# Patient Record
Sex: FEMALE | Race: WHITE | URBAN - METROPOLITAN AREA
[De-identification: names, ages, dates, MRNs, and addresses within clinical notes are randomized per-mention and may not be internally consistent; named-entity substitution may affect disease eponyms.]

---

## 2017-12-24 ENCOUNTER — HOSPITAL ENCOUNTER (EMERGENCY)
Facility: CLINIC | Age: 47
Discharge: HOME | End: 2017-12-24
Attending: NURSE PRACTITIONER | Admitting: NURSE PRACTITIONER
Payer: COMMERCIAL

## 2017-12-24 ENCOUNTER — APPOINTMENT (OUTPATIENT)
Dept: GENERAL RADIOLOGY | Facility: CLINIC | Age: 47
End: 2017-12-24
Attending: NURSE PRACTITIONER
Payer: COMMERCIAL

## 2017-12-24 VITALS
SYSTOLIC BLOOD PRESSURE: 110 MMHG | BODY MASS INDEX: 36.32 KG/M2 | TEMPERATURE: 98.5 F | WEIGHT: 218 LBS | OXYGEN SATURATION: 100 % | HEIGHT: 65 IN | DIASTOLIC BLOOD PRESSURE: 63 MMHG | RESPIRATION RATE: 18 BRPM

## 2017-12-24 DIAGNOSIS — S82.832A OTHER CLOSED FRACTURE OF DISTAL END OF LEFT FIBULA, INITIAL ENCOUNTER: ICD-10-CM

## 2017-12-24 PROCEDURE — 73610 X-RAY EXAM OF ANKLE: CPT | Mod: LT

## 2017-12-24 PROCEDURE — 27786 TREATMENT OF ANKLE FRACTURE: CPT | Mod: LT

## 2017-12-24 PROCEDURE — 25000132 ZZH RX MED GY IP 250 OP 250 PS 637: Performed by: NURSE PRACTITIONER

## 2017-12-24 PROCEDURE — 99284 EMERGENCY DEPT VISIT MOD MDM: CPT | Mod: 25

## 2017-12-24 RX ORDER — HYDROCODONE BITARTRATE AND ACETAMINOPHEN 5; 325 MG/1; MG/1
1-2 TABLET ORAL EVERY 4 HOURS PRN
Qty: 15 TABLET | Refills: 0 | Status: SHIPPED | OUTPATIENT
Start: 2017-12-24

## 2017-12-24 RX ORDER — IBUPROFEN 200 MG
600 TABLET ORAL ONCE
Status: COMPLETED | OUTPATIENT
Start: 2017-12-24 | End: 2017-12-24

## 2017-12-24 RX ADMIN — IBUPROFEN 600 MG: 200 TABLET, FILM COATED ORAL at 21:22

## 2017-12-24 ASSESSMENT — ENCOUNTER SYMPTOMS
HEADACHES: 0
VOMITING: 1

## 2017-12-24 NOTE — ED AVS SNAPSHOT
Emergency Department    [address] [place] [id number]    Phone:  [phone]    Fax:  [phone]                                       Berenice Bowser   MRN: [id number]    Department:   Emergency Department   Date of Visit:  12/24/2017           After Visit Summary Signature Page     I have received my discharge instructions, and my questions have been answered. I have discussed any challenges I see with this plan with the nurse or doctor.    ..........................................................................................................................................  Patient/Patient Representative Signature      ..........................................................................................................................................  Patient Representative Print Name and Relationship to Patient    ..................................................               ................................................  Date                                            Time    ..........................................................................................................................................  Reviewed by Signature/Title    ...................................................              ..............................................  Date                                                            Time

## 2017-12-25 ENCOUNTER — HOSPITAL ENCOUNTER (OUTPATIENT)
Facility: CLINIC | Age: 47
Setting detail: OBSERVATION
Discharge: HOME | End: 2017-12-27
Attending: EMERGENCY MEDICINE | Admitting: INTERNAL MEDICINE
Payer: COMMERCIAL

## 2017-12-25 ENCOUNTER — APPOINTMENT (OUTPATIENT)
Dept: GENERAL RADIOLOGY | Facility: CLINIC | Age: 47
End: 2017-12-25
Attending: EMERGENCY MEDICINE
Payer: COMMERCIAL

## 2017-12-25 DIAGNOSIS — S82.892A ANKLE FRACTURE, LEFT, CLOSED, INITIAL ENCOUNTER: Primary | ICD-10-CM

## 2017-12-25 DIAGNOSIS — S62.102A LEFT WRIST FRACTURE: ICD-10-CM

## 2017-12-25 DIAGNOSIS — S62.102A FRACTURE OF WRIST, LEFT, CLOSED, INITIAL ENCOUNTER: ICD-10-CM

## 2017-12-25 PROBLEM — Z01.818 PREOPERATIVE EXAMINATION: Status: ACTIVE | Noted: 2017-12-25

## 2017-12-25 PROBLEM — Z95.0 H/O CARDIAC PACEMAKER: Status: ACTIVE | Noted: 2017-12-25

## 2017-12-25 PROBLEM — Z91.81 PERSONAL HISTORY OF FALL: Status: ACTIVE | Noted: 2017-12-25

## 2017-12-25 PROBLEM — E03.8 OTHER SPECIFIED HYPOTHYROIDISM: Chronic | Status: ACTIVE | Noted: 2017-12-25

## 2017-12-25 PROBLEM — S82.899A ANKLE FRACTURE: Status: ACTIVE | Noted: 2017-12-25

## 2017-12-25 PROCEDURE — 96374 THER/PROPH/DIAG INJ IV PUSH: CPT

## 2017-12-25 PROCEDURE — 25000128 H RX IP 250 OP 636: Performed by: EMERGENCY MEDICINE

## 2017-12-25 PROCEDURE — 99220 ZZC INITIAL OBSERVATION CARE,LEVL III: CPT | Performed by: INTERNAL MEDICINE

## 2017-12-25 PROCEDURE — 25000128 H RX IP 250 OP 636: Performed by: INTERNAL MEDICINE

## 2017-12-25 PROCEDURE — 29125 APPL SHORT ARM SPLINT STATIC: CPT

## 2017-12-25 PROCEDURE — 99285 EMERGENCY DEPT VISIT HI MDM: CPT | Mod: 25

## 2017-12-25 PROCEDURE — 93005 ELECTROCARDIOGRAM TRACING: CPT

## 2017-12-25 PROCEDURE — 93010 ELECTROCARDIOGRAM REPORT: CPT | Performed by: INTERNAL MEDICINE

## 2017-12-25 PROCEDURE — 25000132 ZZH RX MED GY IP 250 OP 250 PS 637: Performed by: INTERNAL MEDICINE

## 2017-12-25 PROCEDURE — 25000132 ZZH RX MED GY IP 250 OP 250 PS 637: Performed by: EMERGENCY MEDICINE

## 2017-12-25 PROCEDURE — 73110 X-RAY EXAM OF WRIST: CPT | Mod: LT

## 2017-12-25 PROCEDURE — 12000007 ZZH R&B INTERMEDIATE

## 2017-12-25 PROCEDURE — 99207 ZZC CDG-CODE CATEGORY CHANGED: CPT | Performed by: INTERNAL MEDICINE

## 2017-12-25 RX ORDER — BISACODYL 5 MG
15 TABLET, DELAYED RELEASE (ENTERIC COATED) ORAL DAILY PRN
Status: DISCONTINUED | OUTPATIENT
Start: 2017-12-25 | End: 2017-12-27 | Stop reason: HOSPADM

## 2017-12-25 RX ORDER — OXYCODONE HYDROCHLORIDE 5 MG/1
5-10 TABLET ORAL
Status: DISCONTINUED | OUTPATIENT
Start: 2017-12-25 | End: 2017-12-27 | Stop reason: HOSPADM

## 2017-12-25 RX ORDER — MORPHINE SULFATE 2 MG/ML
2-4 INJECTION, SOLUTION INTRAMUSCULAR; INTRAVENOUS
Status: DISCONTINUED | OUTPATIENT
Start: 2017-12-25 | End: 2017-12-27 | Stop reason: HOSPADM

## 2017-12-25 RX ORDER — NALOXONE HYDROCHLORIDE 0.4 MG/ML
.1-.4 INJECTION, SOLUTION INTRAMUSCULAR; INTRAVENOUS; SUBCUTANEOUS
Status: DISCONTINUED | OUTPATIENT
Start: 2017-12-25 | End: 2017-12-27 | Stop reason: HOSPADM

## 2017-12-25 RX ORDER — FENTANYL CITRATE 50 UG/ML
50 INJECTION, SOLUTION INTRAMUSCULAR; INTRAVENOUS
Status: DISCONTINUED | OUTPATIENT
Start: 2017-12-25 | End: 2017-12-25

## 2017-12-25 RX ORDER — ONDANSETRON 2 MG/ML
4 INJECTION INTRAMUSCULAR; INTRAVENOUS EVERY 6 HOURS PRN
Status: DISCONTINUED | OUTPATIENT
Start: 2017-12-25 | End: 2017-12-27 | Stop reason: HOSPADM

## 2017-12-25 RX ORDER — ONDANSETRON 4 MG/1
4 TABLET, ORALLY DISINTEGRATING ORAL EVERY 6 HOURS PRN
Status: DISCONTINUED | OUTPATIENT
Start: 2017-12-25 | End: 2017-12-27 | Stop reason: HOSPADM

## 2017-12-25 RX ORDER — METOPROLOL SUCCINATE 50 MG/1
1 TABLET, EXTENDED RELEASE ORAL DAILY
COMMUNITY

## 2017-12-25 RX ORDER — METOPROLOL SUCCINATE 50 MG/1
50 TABLET, EXTENDED RELEASE ORAL DAILY
Status: DISCONTINUED | OUTPATIENT
Start: 2017-12-25 | End: 2017-12-27 | Stop reason: HOSPADM

## 2017-12-25 RX ORDER — ACETAMINOPHEN 325 MG/1
650 TABLET ORAL EVERY 4 HOURS PRN
Status: DISCONTINUED | OUTPATIENT
Start: 2017-12-25 | End: 2017-12-27 | Stop reason: HOSPADM

## 2017-12-25 RX ORDER — BISACODYL 10 MG
10 SUPPOSITORY, RECTAL RECTAL DAILY PRN
Status: DISCONTINUED | OUTPATIENT
Start: 2017-12-25 | End: 2017-12-27 | Stop reason: HOSPADM

## 2017-12-25 RX ORDER — BISACODYL 5 MG
10 TABLET, DELAYED RELEASE (ENTERIC COATED) ORAL DAILY PRN
Status: DISCONTINUED | OUTPATIENT
Start: 2017-12-25 | End: 2017-12-27 | Stop reason: HOSPADM

## 2017-12-25 RX ORDER — DOCUSATE SODIUM 100 MG/1
100 CAPSULE, LIQUID FILLED ORAL 2 TIMES DAILY
Status: DISCONTINUED | OUTPATIENT
Start: 2017-12-25 | End: 2017-12-27 | Stop reason: HOSPADM

## 2017-12-25 RX ORDER — LEVOTHYROXINE SODIUM 137 UG/1
137 TABLET ORAL
Status: DISCONTINUED | OUTPATIENT
Start: 2017-12-26 | End: 2017-12-27 | Stop reason: HOSPADM

## 2017-12-25 RX ORDER — BISACODYL 5 MG
5 TABLET, DELAYED RELEASE (ENTERIC COATED) ORAL DAILY PRN
Status: DISCONTINUED | OUTPATIENT
Start: 2017-12-25 | End: 2017-12-27 | Stop reason: HOSPADM

## 2017-12-25 RX ORDER — ACETAMINOPHEN 500 MG
1000 TABLET ORAL ONCE
Status: COMPLETED | OUTPATIENT
Start: 2017-12-25 | End: 2017-12-25

## 2017-12-25 RX ORDER — SODIUM CHLORIDE 9 MG/ML
INJECTION, SOLUTION INTRAVENOUS CONTINUOUS
Status: DISCONTINUED | OUTPATIENT
Start: 2017-12-25 | End: 2017-12-26

## 2017-12-25 RX ORDER — IBUPROFEN 200 MG
200 TABLET ORAL EVERY 6 HOURS PRN
Status: DISCONTINUED | OUTPATIENT
Start: 2017-12-25 | End: 2017-12-27 | Stop reason: HOSPADM

## 2017-12-25 RX ADMIN — SODIUM CHLORIDE: 9 INJECTION, SOLUTION INTRAVENOUS at 10:53

## 2017-12-25 RX ADMIN — ACETAMINOPHEN 650 MG: 325 TABLET, FILM COATED ORAL at 12:08

## 2017-12-25 RX ADMIN — ACETAMINOPHEN 1000 MG: 500 TABLET, FILM COATED ORAL at 05:53

## 2017-12-25 RX ADMIN — ACETAMINOPHEN 650 MG: 325 TABLET, FILM COATED ORAL at 18:25

## 2017-12-25 RX ADMIN — DOCUSATE SODIUM 100 MG: 100 CAPSULE, LIQUID FILLED ORAL at 10:52

## 2017-12-25 RX ADMIN — FENTANYL CITRATE 50 MCG: 50 INJECTION, SOLUTION INTRAMUSCULAR; INTRAVENOUS at 06:24

## 2017-12-25 RX ADMIN — OXYCODONE HYDROCHLORIDE 5 MG: 5 TABLET ORAL at 20:26

## 2017-12-25 RX ADMIN — IBUPROFEN 200 MG: 200 TABLET, FILM COATED ORAL at 18:22

## 2017-12-25 RX ADMIN — METOPROLOL SUCCINATE 50 MG: 50 TABLET, EXTENDED RELEASE ORAL at 10:52

## 2017-12-25 RX ADMIN — ENOXAPARIN SODIUM 40 MG: 40 INJECTION SUBCUTANEOUS at 10:52

## 2017-12-25 RX ADMIN — Medication 1 MG: at 21:57

## 2017-12-25 ASSESSMENT — ENCOUNTER SYMPTOMS
JOINT SWELLING: 1
NUMBNESS: 1

## 2017-12-25 NOTE — PROGRESS NOTES
LOLITA  D: LOLITA spoke with both day SW and pt.  I: Pt fell at airport and broke ankle yesterday.  Pt from [place] and visiting for holidays.  Today pt was again at airport, on crutches from fracture yesterday, and pt lost her balance and fell and broke her wrist today.  Pt is in pain, and states she is very nervous that she cannot manage her own basic care since she cannot navigate due to fracture of ankle and fracture of wrist.  Pt states she needs wheelchair, commode, and that pt and day SW tried to get this equipment secured in [place], but were unable to do so (it is a holiday).  Pt is not confident that a wheelchair is enough assistance, since she cannot get into or out of it without assistance.  Pt wanted assurance from SW that she will be able to get the equipment she needs tomorrow morning when she returns to [place].  SW stated we cannot be assured that the medical equipment can be available unless we are told so by [employer] (the company pt has chosen for her medical equipment).  Pt states she has not had any injuries like this before, and is feeling very uncomfortable with how she can take care of herself and not injure herself again, since she feels very unsteady.  SW said SW would ask for additional clarification on whether pt would need PT/OT or other type of rehabilitation since she is dealing with two fractures that limit her mobility significantly.  Pt states she will delay her flight until pt or SW/CC can confirm that her medical equipment can be supplied, and that pt can safely discharge home.  Pt states she has a large number of stairs in her home and is not sure if she can navigate them.   A: Pt is tearful and cooperative.  P: SW to follow.  Cynthia SAINZ

## 2017-12-25 NOTE — ED PROVIDER NOTES
"  History     Chief Complaint:  Ankle Pain     HPI   Berenice Bowser is a 47-year-old female who presents with left ankle pain after slipping and falling on the ice. The patient reports that she is not sure which way she twisted her ankle. She has pain on the lateral side of the foot. The patient did not hit her head or lose consciousness. She does report that she had one episode of vomiting secondary to pain. The patient denies any hip or knee pain. She has not taken anything for the pain. She was not able to walk on her ankle after the incident.     Allergies:  Doxycycline  Penicillins     Medications:    Levothyroxine     Past Medical History:    Hypothyroidism     Past Surgical History:    Pacemaker implant     Family History:    History reviewed. No pertinent family history.     Social History:  Marital Status:   Presents to the ED with       Review of Systems   Gastrointestinal: Positive for vomiting ( x1 secondary to pain ).   Musculoskeletal:        Positive for left ankle pain.    Neurological: Negative for syncope and headaches.     Physical Exam   Patient Vitals for the past 24 hrs:   BP Temp Temp src Heart Rate Resp SpO2 Height Weight   12/24/17 2055 110/63 98.5  F (36.9  C) Oral 70 18 100 % 1.651 m (5' 5\") 98.9 kg (218 lb)     Physical Exam  Nursing notes reviewed. Vitals reviewed.  General: Alert. moderate discomfort . Well kept.  Eyes: Conjunctiva non-injected, non-icteric.  Neck/Throat: Moist mucous membranes. Normal voice.  Cardiac: Regular rhythm. Normal heart sounds. 2+ DP and PT pulse on left.  Normal capillary refill.  Pulmonary: Clear and equal breath sounds bilaterally.   Musculoskeletal:   Left lower extremity: No foot deformity or swelling. No tenderness to palpation over dorsum of foot. Able to flex and extend toes. Tenderness to palpation over lateral malleolus with swelling. No pain over the proximal tibia/fibula, medial malleolus, deltoid ligament, Lis-Franc joint or 5th " metatarsal. Full knee flexion and extension intact without difficulty. No knee tenderness to palpation.   Neurological: Alert and oriented x4. 5/5 strength bilateral lower extremity. Distal sensation intact.  Skin: No laceration or ecchymosis to affected extremity.   Psych: Affect normal. Good eye contact.     Emergency Department Course   Imaging:  Ankle XR, G/E 3 Views, Left  There is an oblique fracture of the distal fibula which is  at and above the level of the ankle mortise joint. The tibia appears  to be laterally subluxed with respect to the talus. No definite  fracture of the medial malleolus head. This is concerning for rupture  of the medial collateral bands. No posterior malleolar fracture is  appreciated although given the mechanism there may be an occult  posterior malleolar fracture.    Report per radiology: Donny Burnette MD (12/24/17 22:17:45)    Radiographic findings were communicated with the patient who voiced understanding of the findings.    Procedures: Supervised by    Splint Placement    PLACEMENT: Custom Orthoglass stirrup splint was applied to the left extremity and after placement I checked and adjusted the fit to ensure proper positioning. The patient was more comfortable with the splint in place. Sensation and circulation are intact after splint placement.     Interventions:  (2122) Ibuprofen, 600 mg, PO     Emergency Department Course:  Nursing notes and vitals reviewed.    (2112) I entered the room with my scribe, obtained the history, and performed an exam of the patient as documented above.    The patient was sent for a ankle x-ray while in the emergency department, findings above.    (2210) I discuss the patient with  in shared service who also evaluated the patient.   (2225) I went to discuss the results of the x-ray with the patient.    Findings and plan explained to the patient. Patient discharged home with instructions regarding supportive care, medications,  and reasons to return. The importance of close follow-up was reviewed. The patient was prescribed Norco for pain management.      Impression & Plan    Medical Decision Making:  Berenice Bowser is a 47 year old female who presents for evaluation of ankle pain after mechanical fall. CMS is intact distally in the extremity.  Pulses are normal and there are no signs of serious sequelae including compartment syndrome of the leg or foot.  X-rays reveal an ankle fracture that does not need reduction at this time. Pain was managed with ibuprofen in the ED and patient declined narcotic pain medication.  They understand that this need for reduction may change with time and orthopedic consultation.  I discussed that surgery is probable due to possible medial collateral band rupture and fibula fracture above the level of the mortise.  There is no indication for ortho consultation from the ED. Rather, close follow-up is indicated in the next days.  Splint and fracture precautions for home.  She will remain non-weight bearing and was instructed on use of crutches.  The patients head to toe trauma exam is otherwise normal at this time and no further trauma workup is needed as I believe there is no signs of serious head, neck, chest, spinal, extremity, pelvic or abdominal injuries.     Diagnosis:    ICD-10-CM    1. Other closed fracture of distal end of left fibula, initial encounter S82.832A     There is an oblique fracture of the distal fibula which is at and above the level of the ankle mortise joint. The tibia appears to be laterally subluxed with respect to the talus. No definite fracture of the medial malleolus head. This is concerning for rupture of the medial collateral bands. No posterior malleolar fracture is appreciated although given the mechanism there may be an occult posterior malleolar fracture.       Disposition:  discharged to home  Discharge Medications:  New Prescriptions    HYDROCODONE-ACETAMINOPHEN (NORCO)  5-325 MG PER TABLET    Take 1-2 tablets by mouth every 4 hours as needed for moderate to severe pain     [facility] EMERGENCY DEPARTMENT    Scribe disclosure:   I, Moustapha Houston, am serving as a scribe on 12/24/2017 at 9:12 PM to personally document services performed by NANCI Patino CNP based on my observations and the provider's statements to me.                   [place], Keesha, CNP  12/24/17 4560

## 2017-12-25 NOTE — IP AVS SNAPSHOT
` `     [facility] ORTHO SPECIALTY UNIT: [phone]                 INTERAGENCY TRANSFER FORM - NOTES (H&P, Discharge Summary, Consults, Procedures, Therapies)   2017                    Hospital Admission Date: 2017  BERENICE DENISE   : 1970  Sex: Female        Patient PCP Information     Provider PCP Type    Provider Not In System General         History & Physicals      H&P by Nivia Ndiaye MD at 2017  8:10 AM     Author:  Nivia Ndiaye MD Service:  Hospitalist Author Type:  Physician    Filed:  2017 10:19 AM Date of Service:  2017  8:10 AM Creation Time:  2017  8:10 AM    Status:  Signed :  Nivia Ndiaye MD (Physician)         [facility]    History and Physical  Hospitalist       Date of Admission:  2017    Cumulative Summary:  Berenice Denise is a 47 year old female[SI1.1] with past medical history significant for hypothyroidism, status post pacemaker placement in  due to recurrent syncopal episodes and was found to have 24 seconds asystole on tilt table, has been asymptomatic since then was admitted from the emergency room when she presented after a fall and ended up getting left wrist fracture. Patient is visiting from [place] and is planned to get  in six days, she also had a fall yesterday and was found to have left ankle fracture and was planning to return to [place] this morning when she had a fall at airport and ended up with left wrist fracture. Patient is admitted for further evaluation and management and for further orthopedic consultation.[SI1.2]    Assessment & Plan     Principal Problem:    Fracture of wrist, left, closed, initial encounter (2017)[SI1.1]: status post fall this a.m. while she was on crutches at airport[SI1.2]    Ankle fracture, left, closed, initial encounter (2017)[SI1.1]: status post fall yesterday.[SI1.2]    Personal history of fall (2017)    Preoperative  examination (12/25/2017)[SI1.1]    -- Will admit patient to orthopedic floor  -- will keep patient NPO at this point so she is evaluated by orthopedics if they would like to take her for surgery today.  -- Will keep patient bed rest till further recommendations by orthopedic  -- start patient on gentle hydration as she is NPO   -- if no plans for surgery today, will start patient on general diet.  -- From preoperative point of view, his revised cardiac risk index is 0.4% for intra-and postoperative cardiac complications. She is at very low risk for complications.  -- Will continue patient home beta-blocker patient can take her dose today.  -- Will continue patient on her home dose of Synthroid.    History of hypothyroidism:  -- continue patient on home dose of Synthroid 137  g PO daily.[SI1.2]    H/O cardiac pacemaker (12/25/2017)[SI1.1]  History of recurrent syncope: underwent detailed workup in the past, was found to have 24 seconds asystole on tilt table test and after that underwent pacemaker placement. Patient has been doing very well and has been asymptomatic since then  -- continue patient on her home dose of beta-blocker.[SI1.2]    DVT Prophylaxis:[SI1.1] Pneumatic Compression Devices[SI1.2]  Code Status:[SI1.1] Full Code[SI1.2]    Disposition: Expected discharge in[SI1.1] next couple of days once she is evaluated by orthopedic and if she would like to go for her ankle surgery here rather than [place].[SI1.2]    Nivia Ndiaye MD,FACP    Primary Care Physician   Provider Not In System    Chief Complaint[SI1.1]   Left wrist pain after the fall at airport, was diagnosed with Left ankle fracture yesterday.    History is obtained from the patient[SI1.2]    Patient Active Problem List   Diagnosis     Fracture of wrist, left, closed, initial encounter     Ankle fracture, left, closed, initial encounter     Other specified hypothyroidism     Personal history of fall     H/O cardiac pacemaker     Preoperative  examination       History of Present Illness   Berenice Bowser is a 47 year old female who[SI1.1] presented to the emergency room after she had a fall at the airport and she landed on her left wrist. She immediately felt the pain and was brought to the emergency room where she was found to have acute mildly impacted fracture of the distal left radius. No significant angulation was seen about the fracture.  Unfortunately patient also had a fall yesterday, and ended up injuring her left ankle and presented to the emergency room. On further evaluation she was found to have oblique fracture of distant fibula at and above the level of ankle joint. As she was visiting from [place] and is planned to get  in six days her ankle was splinted and she was planning to fly back to [place] this morning when she had this recurrent fall at the airport and ended up with left wrist fracture.  Her past medical history is only significant for hypothyroidism for which she has been taking 137  g of Synthroid daily, she took her last bill this morning. She also had history of pacemaker placement in 2014 when she was worked up for recurrent episodes of syncope and was found to have 24 seconds of asystole on tilt table. She has been doing very well after getting the pacemaker and has been taking Toprol-XL 50 mg at night time, she did not take her last leg pill as she was in the emergency room for evaluation of her ankle fracture.  From her preoperative point of view, she is denying any history of diabetes mellitus, coronary artery disease, congestive heart failure or renal disease she has tolerated anesthesia in the past well for her pacemaker placement. Her METS scores are more than 10 ,she ran 8 miles three days ago and is very healthy at the baseline. She is denying any chest pain or shortness of breath with exertion.  Patient is admitted for further evaluation and management at this time after two fractures patient is  hoping to get his ankle surgery here in [place].[SI1.2]    Past Medical History[SI1.1]    I have reviewed this patient's medical history and updated it with pertinent information if needed.[SI1.2]   Past Medical History:   Diagnosis Date     Hypothyroid      Status post cardiac pacemaker procedure[SI1.3]        Past Surgical History[SI1.1]   I have reviewed this patient's surgical history and updated it with pertinent information if needed.[SI1.2]  Past Surgical History:   Procedure Laterality Date     IMPLANT PACEMAKER[SI1.3]         Prior to Admission Medications   Prior to Admission Medications   Prescriptions Last Dose Informant Patient Reported? Taking?   HYDROcodone-acetaminophen (NORCO) 5-325 MG per tablet   No No   Sig: Take 1-2 tablets by mouth every 4 hours as needed for moderate to severe pain      Facility-Administered Medications: None     Allergies   Allergies   Allergen Reactions     Doxycycline      Penicillins Rash       Social History[SI1.1]   I have reviewed this patient's social history and updated it with pertinent information if needed. Berenice Bowser[SI1.2]  reports that she has never smoked. She has never used smokeless tobacco. She reports that she drinks about 0.6 oz of alcohol per week  She reports that she does not use illicit drugs.[SI1.3]    Family History[SI1.1]   I have reviewed this patient's family history and updated it with pertinent information if needed.[SI1.2]   Family History   Problem Relation Age of Onset     Hypertension Mother[SI1.3]        Review of Systems   CONSTITUTIONAL:[SI1.1]  Negative[SI1.2] for  fatigue and generalized weakness.  EYES:  negative for blurred vision and visual disturbance  HEENT:  negative for hoarseness and voice change  RESPIRATORY:  negative for  cough with sputum, dyspnea, wheezing and chest pain  CARDIOVASCULAR:  negative for  chest pain, palpitations, orthopnea, exertional chest pressure/discomfort[SI1.1]. She does have history of  syncope and then on further workup was found to have prolonged episodes of asystole, her symptoms have resolved after pacemaker placement.[SI1.2]  GASTROINTESTINAL: Negative for abd pain, nausea , vomiting ,constipation and abdominal pain  GENITOURINARY: Negative for burning /urgency and frequency.  HEMATOLOGIC/LYMPHATIC:  negative  ALLERGIC/IMMUNOLOGIC:  negative for drug reactions  ENDOCRINE:  negative for diabetic symptoms including polyuria, polydipsia and weight loss  MUSCULOSKELETAL:[SI1.1] positive for left ankle pain from fracture and left wrist pain.[SI1.2]  NEUROLOGICAL:  negative  BEHAVIOR/PSYCH: negative    Physical Exam   Temp: 97  F (36.1  C) Temp src: Oral BP: 121/82 Pulse: 111   Resp: 16 SpO2: 99 % O2 Device: None (Room air)    Vital Signs with Ranges  Temp:  [97  F (36.1  C)-98.5  F (36.9  C)] 97  F (36.1  C)  Pulse:  [111] 111  Heart Rate:  [70] 70  Resp:  [16-18] 16  BP: (110-121)/(63-82) 121/82  SpO2:  [99 %-100 %] 99 %  218 lbs 0 oz    Constitutional: Awake, alert,oriented to time, place and person , cooperative, no apparent distress.Pleasent and cooperative ,[SI1.1] fiance[SI1.2] present at the bedside   Eyes: Conjunctiva and pupils examined and normal.  HEENT: Moist mucous membranes, normal dentition.  Respiratory: Clear to auscultation bilaterally, no crackles or wheezing.  Cardiovascular: Regular rate and rhythm, normal S1 and S2, and no murmur noted.  GI: Soft, non-distended, non-tender, normal bowel sounds.  Lymph/Hematologic: No anterior cervical or supraclavicular adenopathy.  Skin: No rashes, no cyanosis, no edema.  Musculoskeletal:[SI1.1] left ankle and left wrist is in splint[SI1.2]  Neurologic: Cranial nerves 2-12 intact, normal strength and sensation.  Psychiatric: Alert, oriented to person, place and time, no obvious anxiety or depression.    Data   Data reviewed today:  I personally reviewed[SI1.1] the tele image(s) showing nsr[SI1.2].  No lab results found in last 7  days.    Imaging:  Recent Results (from the past 24 hour(s))   Ankle XR, G/E 3 views, left    Narrative    LEFT ANKLE THREE OR MORE VIEWS   12/24/2017 9:47 PM     HISTORY: Lateral malleolus pain after inversion injury.     COMPARISON: None.      Impression    IMPRESSION: There is an oblique fracture of the distal fibula which is  at and above the level of the ankle mortise joint. The tibia appears  to be laterally subluxed with respect to the talus. No definite  fracture of the medial malleolus head. This is concerning for rupture  of the medial collateral bands. No posterior malleolar fracture is  appreciated although given the mechanism there may be an occult  posterior malleolar fracture.      DEIDRE OLSEN MD   Wrist XR, G/E 3 views, left    Narrative    LEFT WRIST 3 VIEWS   12/25/2017 6:14 AM     HISTORY: Fall.    COMPARISON: None.      Impression    IMPRESSION:   1. Acute mildly impacted fracture of the distal left radius. No  significant angulation about the fracture and no definite involvement  of the radiocarpal joint.  2. Nondisplaced acute transverse fracture of the left ulnar styloid  process.    REBEKAH BEST MD[SI1.1]          Revision History        User Key Date/Time User Provider Type Action    > SI1.3 12/25/2017 10:19 AM Nivia Ndiaye MD Physician Sign     SI1.2 12/25/2017  9:58 AM Nivia Ndiaye MD Physician      SI1.1 12/25/2017  8:10 AM Nivia Ndiaye MD Physician                   Discharge Summaries     No notes of this type exist for this encounter.         Consult Notes      Consults signed by Yuni Mas MD at 12/25/2017  1:51 PM      Author:  Yuni Mas MD Service:  Orthopedics Author Type:  Physician    Filed:  12/25/2017  1:51 PM Date of Service:  12/25/2017 10:34 AM Creation Time:  12/25/2017 11:01 AM    Status:  Signed :  Yuni Mas MD (Physician)         DATE OF SERVICE:  12/25/2017      CHIEF COMPLAINT:  Wrist pain and ankle pain.      HISTORY OF  PRESENT ILLNESS:  Berenice Bowser is a 47-year-old woman who on 12/24 slipped and fractured her left ankle.  She was mobilizing at the airport and slipped and injured her wrist.  She was found to have a left wrist fracture.  Because of lack of mobility she was admitted and consultation requested.      Pain is well controlled with intermittent medications.  At this time she is alert and oriented, able to converse.  The biggest issue facing us is social in nature regarding patient transport back to home in [place].      No previous history of ankle fracture or wrist fracture.  Her wrist is more painful than her ankle.  The pain is achy at baseline, intermittently sharp, worse with attempted mobilization.      PAST MEDICAL HISTORY:  Remarkable for hypothyroidism, history of syncope.      MEDICATIONS:  Intermittent Norco for ankle fracture.      ALLERGIES:  Doxycycline, penicillin.      SOCIAL HISTORY:  Reveals she is about to be .  She does not use tobacco or nicotine and lives in [place].      PHYSICAL EXAMINATION:   GENERAL:  Appears healthy, alert and oriented x3.  Mood and affect appropriate.  Respirations nonlabored.     VITAL SIGNS:  Show her afebrile at 98.3, pulse of 70, respiratory rate of 16, /63.  She is satting 96% on room air.      Examination of her left upper extremity reveals a long arm splint which was well padded and positioned with the arm in flexion.  She does not have any finger swelling or bruising and brisk capillary refill less than 2 seconds.  Distal neurovascularly intact to examination with limitation of her cast which include MCP and IP joint extension.      Examination of her left lower extremity reveals a short leg nonweightbearing splint appears to be well fitting.  Brisk capillary refill to her toes show a nice pedicure with brisk capillary refill less than 2 seconds.  No tenderness to squeeze over proximal calf which is soft and nontender, again neurovascularly  intact to examination with limitations of her splints in place.      IMAGING:  X-rays of her left wrist dated today show a mildly displaced extraarticular fracture of the distal radius.      Examination dated 12/24/2017 of her left ankle show a distal fibula fracture with an increased medial clear space.      IMPRESSION:     1.  Displaced extra-articular left distal radius fracture, acute.   2.  Acute left distal fibular fracture with medial clear space widening.      PLAN:  At this point, I reviewed with her findings on her x-rays and discussed timing.  Surgical intervention may be required for the ankle and on an elective basis for her wrist given her mobilization issues.  I recommended that this be done on an outpatient basis for several reasons including definitive care and subsequent followup and hazards of travel postoperatively and a longer stay here in [place] versus returning home and having things dealt with in her home with appropriate followup.  Therefore, the plan is today for her to have social work with the assistance of arranging wheelchair for her which she can have both here in [place] and in [place] or at least have 1 arranged for her in [place] and one that is temporary to use here in [place].  She can discharge from an orthopedic perspective at any time.  Would recommend close outpatient followup in orthopedics in approximately 7-10 days.  I would also recommend that intermittent oral narcotics be used for pain and Lovenox or other blood thinner per medicine's choice for DVT prophylaxis, preferably from an orthopedic standpoint, Lovenox would be easier in case surgical management is warranted once she returns home, which is easily discontinued.  Greater than one hour was spent with the patient, greater than 50% counseling and coordination of her.         LETITIA DODSON MD             D: 12/25/2017 10:34   T: 12/25/2017 11:00   MT:       Name:     PITA DENISE   MRN:       [id number]        Account:       [id number]   :      1970           Consult Date:  2017      Document: [id number]    [CH1.1]   Revision History        User Key Date/Time User Provider Type Action    > CH1.1 2017  1:51 PM Yuni Mas MD Physician Sign            Consults by Yuni Mas MD at 2017 10:19 AM     Author:  Yuni Mas MD Service:  Orthopedics Author Type:  Physician    Filed:  2017 10:19 AM Date of Service:  2017 10:19 AM Creation Time:  2017 10:16 AM    Status:  Signed :  Yuni Mas MD (Physician)     Consult Orders:    1. Orthopedic Surgery IP Consult: Patient to be seen: Routine - within 24 hours; left ankle and wrist fracture; Consultant may enter orders: Yes [[id number]] ordered by Nivia Ndiaye MD at 17 0810                A/P:  48 yo woman with L ankle and L distal radius fracture.  Lives in [place].  Plan is to return home for definitive care.  NWB to L LE and L UE.  Ice and elevate.  Orthopedic follow-up in 7-10 days.  SW to help arrange for wheelchair for her to be transported.  May d/c per ortho needs with ortho follow up as outlined.  Consider lovenox or aspirin for DVT prophylaxis.[CH1.1]     Revision History        User Key Date/Time User Provider Type Action    > CH1.1 2017 10:19 AM Yuni Mas MD Physician Sign                     Progress Notes - Physician (Notes from 17 through 17)      Progress Notes by Becky Caldwell RN at 2017  3:53 PM     Author:  Becky Caldwell RN Service:  Care Coordinator Author Type:      Filed:  2017  3:54 PM Date of Service:  2017  3:53 PM Creation Time:  2017  3:53 PM    Status:  Signed :  Becky Caldwell RN ()         Spoke with Karishma at [facility] regarding the order-they received the fax and will call Berenice in her room to confirm with her. The delivery address.[CK1.1]     Revision  History        User Key Date/Time User Provider Type Action    > CK1.1 12/26/2017  3:54 PM Becky Caldwell RN Case Manager Sign            Progress Notes by Becky Caldwell RN at 12/26/2017  3:04 PM     Author:  Becky Caldwell RN Service:  Care Coordinator Author Type:      Filed:  12/26/2017  3:16 PM Date of Service:  12/26/2017  3:04 PM Creation Time:  12/26/2017  3:04 PM    Status:  Signed :  Becky Caldwell RN ()         DME scripts faxed to [facility],commode and chair shower. [facility] form walker.  I spoke with Pradeep and they do not have a safety frame in stock and it will take 5 days to obtain.  I faxed delivery address to them -[address] [place] [address] [place]. Her # is [phone] The plan is for the patient to leave at 4:30 am for the airport. She has follow up in [place] with her ortho this week. She is taking tylenol for pain and does not feel she needs any home meds.  Will call Pradeep back to confirm they got the fax.[CK1.1]     Revision History        User Key Date/Time User Provider Type Action    > CK1.1 12/26/2017  3:16 PM Becky Caldwell RN Case Manager Sign            Progress Notes by Becky Caldwell RN at 12/26/2017  9:26 AM     Author:  Becky Caldwell RN Service:  Care Coordinator Author Type:      Filed:  12/26/2017  2:07 PM Date of Service:  12/26/2017  9:26 AM Creation Time:  12/26/2017  1:56 PM    Status:  Signed :  Becky Caldwell RN ()         I called [facility] to obtain information needed for her medical equipment. I spoke with Berenice at [facility] regarding needed equipment-commode ,wheel chair,chair shower. They need diag,ht/wt and demographics faxed.    When I spoke with the patient she wanted to hold off on the faxing of scripts to [facility] until she spoke with Yelena regarding coverage of equipment since her plan is not clear as to going directly home or to the hospital in  [place].[CK1.1]     Revision History        User Key Date/Time User Provider Type Action    > CK1.1 12/26/2017  2:07 PM Becky Caldwell RN Case Manager Sign            Progress Notes by Nivia Ndiaye MD at 12/26/2017  8:52 AM     Author:  Nivia Ndiaye MD Service:  Hospitalist Author Type:  Physician    Filed:  12/26/2017  1:25 PM Date of Service:  12/26/2017  8:52 AM Creation Time:  12/26/2017  8:52 AM    Status:  Signed :  Nivia Ndiaye MD (Physician)         [facility]    Hospitalist Progress Note :     Cumulative Summary:[SI1.1] Berenice Bowser is a 47 year old female with past medical history significant for hypothyroidism, status post pacemaker placement in 2014 due to recurrent syncopal episodes and was found to have 24 seconds asystole on tilt table, has been asymptomatic since then, was admitted from the emergency room when she presented after a fall and was found to have left wrist fracture. Patient is visiting from [place] and is planned to get  in six days, she previously had a fall a day before admission, was found to have left ankle fracture and was planning to return to [place] when she again fell at airport and now got left wrist fracture. Patient was admitted for further evaluation and management and orthopedic consultation. Patient was evaluated by orthopedic and at this time although surgical intervention may be required for the ankle and on an elective basis for her wrist given her mobilization issues, at this time it is planned to pursue those treatments in [place] once she returns care coordinator and  are working closely with patient to help making arrangements for her travel and to also order necessary medical equipments that she might need before she is evaluated by healthcare provider in [place].[SI1.2]      Assessment & Plan[SI1.3]     Fracture of wrist, left, closed, initial encounter (12/25/2017): status post fall yesterday.  while she was on crutches at airport    Ankle fracture, left, closed, initial encounter (12/25/2017): status post fall day before yesterday    Personal history of fall (12/25/2017)    Preoperative examination (12/25/2017)     -- continue to monitor patient closely   -- appreciate orthopedic help, plan is to proceed with surgical intervention when patient returns to [place].  --Patient is a started back on her home medications.   -- Patient is also getting evaluated by physical and occupational therapy this morning for recommendations regarding the discharge.  -- At this time prescriptions are given for wheelchair, bedside commode and shower chair.     History of hypothyroidism:  -- continue patient on home dose of Synthroid 137  g PO daily.     H/O cardiac pacemaker (12/25/2017)  History of recurrent syncope: underwent detailed workup in the past, was found to have 24 seconds asystole on tilt table test and after that underwent pacemaker placement. Patient has been doing very well and has been asymptomatic since then  -- continue patient on her home dose of beta-blocker.[SI1.2]    DVT Prophylaxis:[SI1.1] Enoxaparin (Lovenox) SQ, discussed with patient regarding continuing with four dose aspirin once she reaches home till she is seen by her primary care physician or orthopedic physician.  Patient is receiving Lovenox 40 mg subcu here , patient has an early flight tomorrow will administer her dose earlier so she has a better DVT coverage due to being immobile and undergoing 10 hours of flight.[SI1.2]    Code Status:[SI1.1] Full Code[SI1.4]    Disposition: Expected discharge[SI1.1] tomorrow ,  and care coordinators are working very closely with patient regarding arrangements for travel and for proper medical equipment need, highly appreciate their help.[SI1.2]    Nivia Ndiaye[SI1.3], MD, FACP  Text Page (7am - 6pm)[SI1.1]      Interval History[SI1.3]   patient was seen and examined this morning, what  is about to work with physical therapy, ashlyn also present in the room. She told me that her pain is well controlled with Tylenol and she does not want to fill the prescription of oxycodone and instead made her sick. She told me that she feels much more comfortable and more confident about being able to manage with two fractures and is really hoping to reach home tomorrow.  We discussed about giving her Lovenox short before she leaves the hospital so she has a better coverage for DVT prophylaxis during her flight. I discussed with her to continue taking aspirin 325 mg BID till she is evaluated by her primary care physician or orthopedic doctor after she reaches [place].  Patient showed understanding and is appreciative of all the care she has received here.[SI1.2]    -Data reviewed today: I reviewed all new labs and imaging results over the last 24 hours.    I personally reviewed[SI1.1] no images or EKG's today[SI1.2].[SI1.1]    Physical Exam   Temp: 98.1  F (36.7  C) Temp src: Oral BP: 118/70 Pulse: 56   Resp: 16 SpO2: 95 % O2 Device: None (Room air)    Vitals:    12/25/17 0550   Weight: 98.9 kg (218 lb)[SI1.3]     Vital Signs with Ranges[SI1.1]  Temp:  [98.1  F (36.7  C)-98.4  F (36.9  C)] 98.1  F (36.7  C)  Pulse:  [54-70] 56  Resp:  [16] 16  BP: (115-134)/(63-77) 118/70  SpO2:  [94 %-96 %] 95 %  I/O last 3 completed shifts:  In: 460 [P.O.:460]  Out: -[SI1.3]     GENERAL: Alert , awake and oriented. NAD. Conversational, appropriate.   HEENT: Normocephalic. EOMI. No icterus or injection. Nares normal.   LUNGS: Clear to auscultation. No dyspnea at rest.   HEART: Regular rate. Extremities perfused.   ABDOMEN: Soft, nontender, and nondistended. Positive bowel sounds.   EXTREMITIES:[SI1.1] left wrist and ankle is in splint[SI1.2]  NEUROLOGIC: Moves extremities x4 on command. No acute focal neurologic abnormalities noted.[SI1.1]     Medications        docusate sodium  100 mg Oral BID     levothyroxine  137 mcg Oral  QAM AC     metoprolol  50 mg Oral Daily     enoxaparin  40 mg Subcutaneous Q24H       Data     Recent Labs  Lab 12/26/17  0611   WBC 5.0   HGB 12.7   MCV 90         POTASSIUM 3.7   CHLORIDE 109   CO2 26   BUN 22   CR 0.72   ANIONGAP 5   VIC 8.6   GLC 93[SI1.3]       Imaging:[SI1.1]   No results found for this or any previous visit (from the past 24 hour(s)).[SI1.3]       Revision History        User Key Date/Time User Provider Type Action    > SI1.4 12/26/2017  1:25 PM Nivia Ndiaye MD Physician Sign     SI1.2 12/26/2017  1:12 PM Nivia Ndiaye MD Physician      SI1.3 12/26/2017  8:53 AM Nivia Ndiaye MD Physician      SI1.1 12/26/2017  8:52 AM Nivia Ndiaye MD Physician             Progress Notes by Delilah Ramos PT at 12/26/2017  1:04 PM     Author:  Delilah Ramos PT Service:  (none) Author Type:  Physical Therapist    Filed:  12/26/2017  1:04 PM Date of Service:  12/26/2017  1:04 PM Creation Time:  12/26/2017  1:04 PM    Status:  Signed :  Delilah Ramos PT (Physical Therapist)          12/26/17 1101   Quick Adds   Type of Visit Initial PT Evaluation   Living Environment   Lives With significant other   Living Arrangements house   Home Accessibility bed and bath are not on the first floor;stairs to enter home;stairs within home   Number of Stairs to Enter Home 2  (platform steps)   Number of Stairs Within Home 12  (with rail, to upstairs office and tub/shower; does not need)   Stair Railings at Home inside, present on right side   Transportation Available car;family or friend will provide   Living Environment Comment Pt reports she plans to adjust her living environment to accommodate all needs on main level.   Self-Care   Dominant Hand right   Usual Activity Tolerance excellent   Current Activity Tolerance good   Regular Exercise yes   Activity/Exercise Type running/jogging   Exercise Amount/Frequency 3-5 times/wk   Equipment Currently Used at Home none   Functional Level Prior    Ambulation 0-->independent   Transferring 0-->independent   Toileting 0-->independent   Bathing 0-->independent   Dressing 0-->independent   Eating 0-->independent   Communication 0-->understands/communicates without difficulty   Swallowing 0-->swallows foods/liquids without difficulty   Cognition 0 - no cognition issues reported   Fall history within last six months yes   Number of times patient has fallen within last six months 2   Which of the above functional risks had a recent onset or change? ambulation;transferring;toileting;bathing;dressing;fall history   Prior Functional Level Comment Pt reports IND with ADLs/IADLs and functional mobility without AD, including working full time for which she travels by plane frequently, works from home as well.   General Information   Onset of Illness/Injury or Date of Surgery - Date 12/25/17   Referring Physician  Nivia Ndiaye MD    Patient/Family Goals Statement To go home to [place]   Pertinent History of Current Problem (include personal factors and/or comorbidities that impact the POC) Pt is a 46yo female admitted under observation status after sustaining two falls on separate days, first fall resulting in L mildly impacted distal radius fracture, L nondisplaced transverse fracture of ulnar styloid process; second fall resulting in L distal fibular fracture. Pt NWB on LUE and LLE. PMH significant for PPM ~3 years ago.   Precautions/Limitations fall precautions   Weight-Bearing Status - LUE nonweight-bearing   Weight-Bearing Status - LLE nonweight-bearing   General Observations Pt has splints/cast to L ankle and L wrist (fingers to distal humerus)   General Info Comments Activity: up with assist, NWB on LUE/LLE   Cognitive Status Examination   Orientation orientation to person, place and time   Level of Consciousness alert   Follows Commands and Answers Questions 100% of the time;able to follow multistep instructions   Personal Safety and Judgment intact   Memory intact  "  Pain Assessment   Patient Currently in Pain (\"minimal\")   Integumentary/Edema   Integumentary/Edema Comments L ankle and L wrist casted/splinted   Posture    Posture Not impaired   Range of Motion (ROM)   ROM Comment BLE WNL with AROM with exception of L ankle NT d/t fracture   Strength   Strength Comments BLE WFL for functional mobility, RLE 5/5   Bed Mobility   Bed Mobility Comments Independent supine<>sit   Transfer Skills   Transfer Comments CGA sit>stand to L platform walker   Gait   Gait Comments CGA 5' with L platform walker, NWB on LUE/LLE   Balance   Balance Comments Good at EOB, good with static stance   Sensory Examination   Sensory Perception Comments Denies N/T   General Therapy Interventions   Planned Therapy Interventions bed mobility training;gait training;strengthening;transfer training;home program guidelines;progressive activity/exercise   Clinical Impression   Criteria for Skilled Therapeutic Intervention yes, treatment indicated   PT Diagnosis Impaired gait   Influenced by the following impairments Pain, weakness, NWB LUE/LLE, decreased activity tolerance   Functional limitations due to impairments Decreased safety and independence with functional transfers, giat, stiars   Clinical Presentation Stable/Uncomplicated   Clinical Presentation Rationale clinically improving   Clinical Decision Making (Complexity) Low complexity   Predicted Duration of Therapy Intervention (days/wks) eval and single treatment   Anticipated Equipment Needs at Discharge wheelchair;platform walker;stool riser;shower chair   Anticipated Discharge Disposition Home with Assist   Risk & Benefits of therapy have been explained Yes   Patient, Family & other staff in agreement with plan of care Yes   [facility]-PAC TM \"6 Clicks\"   2016, Trustees of [facility], under license to [employer].  All rights reserved.   6 Clicks Short Forms Basic Mobility Inpatient Short Form   [facility] AM-PAC  \"6 Clicks\" " V.2 Basic Mobility Inpatient Short Form   1. Turning from your back to your side while in a flat bed without using bedrails? 4 - None   2. Moving from lying on your back to sitting on the side of a flat bed without using bedrails? 4 - None   3. Moving to and from a bed to a chair (including a wheelchair)? 3 - A Little   4. Standing up from a chair using your arms (e.g., wheelchair, or bedside chair)? 3 - A Little   5. To walk in hospital room? 3 - A Little   6. Climbing 3-5 steps with a railing? 2 - A Lot   Basic Mobility Raw Score (Score out of 24.Lower scores equate to lower levels of function) 19   Total Evaluation Time   Total Evaluation Time (Minutes) 10[KJ1.1]        Revision History        User Key Date/Time User Provider Type Action    > KJ1.1 12/26/2017  1:04 PM Delilah Ramos, PT Physical Therapist Sign            Progress Notes by Becky Whiting at 12/26/2017 11:15 AM     Author:  Becky Whiting Service:  Social Work Author Type:      Filed:  12/26/2017 11:29 AM Date of Service:  12/26/2017 11:15 AM Creation Time:  12/26/2017 11:15 AM    Status:  Signed :  Becky Whiting ()         Care Transition Initial Assessment -   Reason For Consult: discharge planning  Met with: PATIENT,FAMILY[CS1.1]    Principal Problem:    Fracture of wrist, left, closed, initial encounter  Active Problems:    Ankle fracture, left, closed, initial encounter    Other specified hypothyroidism    Personal history of fall    H/O cardiac pacemaker    Preoperative examination    Ankle fracture[CS1.2]         DATA  Lives With: significant other  Living Arrangements: house  Description of Support System: Supportive, Involved  Who is your support system?: Significant Other  Support Assessment: Adequate family and caregiver support, Adequate social supports.   Identified issues/concerns regarding health management: Need for increased support/equipment at time of discharge.       Transportation  Available: car, family or friend will provide    ASSESSMENT  Cognitive Status:  Alert, awake, oriented   Concerns to be addressed: discharge planning     LOLITA reviewed chart and met with patient, significant other and RN CC.  Patient was admitted 12/25/17 with Ankle fracture.  Anticipated discharge date:  12/27.  LOLITA introduced self and role.  Patient resides in [place] and states today her biggest concerns are when she flies home tomorrow, will her needs be met at home.  Significant other stated he is off work for the next 2-3 weeks, thus, would be able to assist patient, however patient expresses concern if his assistance will be enough.  Patient is awaiting PT today to see what their recommendation is.  Patient is requesting the following equipment:  Commode, w/c and shower chair.  RN CC has placed call to [employer] who is requesting scripts be faxed to them w/a plan to deliver the equipment to patient's home, if approved.  Per patient, however, she states her primary physician has now asked her to go to her home hospital ED for evaluation upon arrival in [place].  Pt asks RN CC to not fax scripts at this point, should she be admitted to her local hospital.  LOLITA suggests patient call [employer] herself to check on the following:  Does she have SNF benefit; can equipment be delivered to her home even if she may be admitted to her local hospital.  Patient appears to agree with this plan and stated she will call her insurance once PT is complete and update LOLITA or RN CC with how she wishes to proceed.    PLAN  Financial costs for the patient includes:  Possible transportation costs, respite care .  Patient given options and choices for discharge:  Yes; Home vs Respite care/SNF in [place], if recommended  Patient/family is agreeable to the plan?  YES  Patient Goals and Preferences: Discharge to home .  Patient anticipates discharging to:  home .    Continue to assist with discharge planning, as  needed.    JD Whipple[CS1.1]           Revision History        User Key Date/Time User Provider Type Action    > CS1.2 12/26/2017 11:29 AM Becky Whiting  Sign     CS1.1 12/26/2017 11:15 AM Becky Whiting              Progress Notes by Dipak Seay OT at 12/26/2017  9:10 AM     Author:  Dipak Seay OT Service:  (none) Author Type:  Occupational Therapist    Filed:  12/26/2017  9:10 AM Date of Service:  12/26/2017  9:10 AM Creation Time:  12/26/2017  9:10 AM    Status:  Signed :  Dipak Seay OT (Occupational Therapist)          12/26/17 0858   Quick Adds   Type of Visit Initial Occupational Therapy Evaluation   Living Environment   Lives With significant other   Living Arrangements house   Home Accessibility house is not wheelchair accessible;stairs to enter home;stairs within home   Number of Stairs to Enter Home 2  (pt reports a landing between stairs)   Number of Stairs Within Home 12  (to office and upstairs bathroom with tub - pt uses downstair)   Stair Railings at Home inside, present on right side  (partial railing)   Transportation Available car;family or friend will provide   Living Environment Comment Pt lives on main floor of house usually.  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   Self-Care   Dominant Hand right   Usual Activity Tolerance excellent   Current Activity Tolerance fair   Regular Exercise yes   Activity/Exercise Type running/jogging   Exercise Amount/Frequency 1 hr;3-5 times/wk   Equipment Currently Used at Home none   Activity/Exercise/Self-Care Comment pt was training for a half-marathon   Functional Level Prior   Ambulation 0-->independent   Transferring 0-->independent   Toileting 0-->independent   Bathing 0-->independent   Dressing 0-->independent   Eating 0-->independent   Communication 0-->understands/communicates without difficulty   Swallowing 0-->swallows foods/liquids without difficulty   Cognition 0 -  no cognition issues reported   Fall history within last six months yes   Number of times patient has fallen within last six months 2   Which of the above functional risks had a recent onset or change? ambulation;transferring;bathing;dressing   Prior Functional Level Comment pt was independent, a little overwhelmed by her injuries   General Information   Onset of Illness/Injury or Date of Surgery - Date 12/25/17   Referring Physician Nivia Ndiaye   Patient/Family Goals Statement Pt planning on returning home to [place] for rehab as needed   Additional Occupational Profile Info/Pertinent History of Current Problem Pt initially fell slipping on ice, breaking her left ankle.  Ankle casted, pt given crutches to return home to [place].  Fell going into the airport and broke her left wrist.  PMH includes pacemaker implant 3 years ago, hypothryoridism   Precautions/Limitations fall precautions   Weight-Bearing Status - LUE nonweight-bearing  (left wrist)   Weight-Bearing Status - LLE nonweight-bearing   Cognitive Status Examination   Orientation orientation to person, place and time   Level of Consciousness alert   Able to Follow Commands WNL/WFL   Personal Safety (Cognitive) WNL/WFL   Memory intact   Attention No deficits were identified   Visual Perception   Visual Perception No deficits were identified   Pain Assessment   Patient Currently in Pain Yes, see Vital Sign flowsheet   Range of Motion (ROM)   ROM Quick Adds Other (describe)   ROM Comment pt has full ROM on right side, left side limited by casts   Coordination   Upper Extremity Coordination Left UE impaired  (from cast)   Mobility   Bed Mobility Bed mobility skill: Supine to sit   Bed Mobility Skill: Supine to Sit   Level of [place]: Supine/Sit stand-by assist   Physical Assist/Nonphysical Assist: Supine/Sit verbal cues;1 person assist   Assistive Device: Supine/Sit bedrail   Upper Body Dressing   Level of [place]: Dress Upper Body stand-by assist  (using one  "handed techniques)   Physical Assist/Nonphysical Assist: Dress Upper Body verbal cues;1 person assist   Lower Body Dressing   Level of [place]: Dress Lower Body stand-by assist  (using one handed techniques)   Physical Assist/Nonphysical Assist: Dress Lower Body verbal cues;1 person assist   Activities of Daily Living Analysis   Impairments Contributing to Impaired Activities of Daily Living balance impaired;pain;post surgical precautions;ROM decreased;strength decreased   General Therapy Interventions   Planned Therapy Interventions IADL retraining;transfer training;home program guidelines   Clinical Impression   Criteria for Skilled Therapeutic Interventions Met yes, treatment indicated   OT Diagnosis Decreased ROM and independence in ADLS   Influenced by the following impairments weakness, post surgical precautions, decreased ROM   Assessment of Occupational Performance 5 or more Performance Deficits   Identified Performance Deficits Decreased independence in dressing, grooming, bathing, functional transfers. work and home tasks   Clinical Decision Making (Complexity) High complexity   Therapy Frequency daily   Predicted Duration of Therapy Intervention (days/wks) 3 days   Anticipated Discharge Disposition Home with Outpatient Therapy   Risks and Benefits of Treatment have been explained. Yes   Patient, Family & other staff in agreement with plan of care Yes   [facility] TM \"6 Clicks\"   2016, Trustees of [facility], under license to [employer].  All rights reserved.   6 Clicks Short Forms Daily Activity Inpatient Short Form   [facility]  \"6 Clicks\" Daily Activity Inpatient Short Form   1. Putting on and taking off regular lower body clothing? 3 - A Little   2. Bathing (including washing, rinsing, drying)? 2 - A Lot   3. Toileting, which includes using toilet, bedpan or urinal? 3 - A Little   4. Putting on and taking off regular upper body clothing? 3 - A Little   5. " Taking care of personal grooming such as brushing teeth? 3 - A Little   6. Eating meals? 3 - A Little   Daily Activity Raw Score (Score out of 24.Lower scores equate to lower levels of function) 17   Total Evaluation Time   Total Evaluation Time (Minutes) 15[JF1.1]        Revision History        User Key Date/Time User Provider Type Action    > JF1.1 12/26/2017  9:10 AM Dipak Seay OT Occupational Therapist Sign            Progress Notes by Cynthia Deras LICSW at 12/25/2017  5:31 PM     Author:  Cynthia Deras LICSW Service:  Social Work Author Type:      Filed:  12/25/2017  5:56 PM Date of Service:  12/25/2017  5:31 PM Creation Time:  12/25/2017  5:31 PM    Status:  Signed :  Cynthia Deras LICSW ()         LOLITA  D: LOLITA spoke with both day SW and pt.  I: Pt fell at airport and broke ankle[MH1.1] yesterday.  Pt from [place] and visiting for holidays.  Today pt was again at airport, on crutches from fracture yesterday, and pt lost her balance and fell and broke her wrist today.  Pt is in pain, and states she is very nervous that she cannot manage her own basic care since she cannot navigate due to fracture of ankle and fracture of wrist.  Pt states she needs wheelchair, commode, and that pt and day SW tried to get this equipment secured in [place], but were unable to do so (it is a holiday).  Pt is not confident that a wheelchair is enough assistance, since she cannot get into or out of it without assistance.  Pt wanted assurance from LOLITA that she will be able to get the equipment she needs tomorrow morning when she returns to [place].  SW stated we cannot be assured that the medical equipment can be available unless we are told so by [employer] (the company pt has chosen for her medical equipment).  Pt states she has not had any injuries like this before, and is feeling very uncomfortable with how she can take care of herself and not injure herself again, since she  feels very unsteady.  SW said SW would ask for additional clarification on whether pt would need PT/OT or other type of rehabilitation since she is dealing with two fractures that limit her mobility significantly.  Pt states she will delay her flight until pt or SW/CC can confirm that her medical equipment can be supplied, and that pt can safely discharge home.  Pt states she has a large number of stairs in her home and is not sure if she can navigate them.   A: Pt is tearful and cooperative.  P: SW to follow.  Cynthia SAINZ[MH1.2]     Revision History        User Key Date/Time User Provider Type Action    > MH1.2 12/25/2017  5:56 PM Cynthia Deras LICSW  Sign     MH1.1 12/25/2017  5:31 PM Cynthia Deras LICSW              Progress Notes by Curry Maradiaga LSW at 12/25/2017 12:20 PM     Author:  Curry Maradiaga LSW Service:  (none) Author Type:      Filed:  12/25/2017  4:43 PM Date of Service:  12/25/2017 12:20 PM Creation Time:  12/25/2017 12:20 PM    Status:  Signed :  Curry Maradiaga LSW ()         SW:    D: SW Consulted by orthopedics. Per ortho, patient is requesting medical equipment for discharge. SW spoke with patient about discharge needs. Patient states that she doesn't have any needs when leaving [place]. Plans to take wheelchair and get assistance from family in [place]. Patient reports that she will need wheelchair, commode, and possibly hospital bed in her home. Patient reports that she is aware that [employer] provides equipment in her hometown. Patient requests SW to contact [employer] and find out what they need in order for patient to get supplies when she lands in [place]. SW contacted [employer] ([phone]), they are requesting scripts faxed to [phone]. Processing time is usually 24 hours or longer. Patient updated to the above[NA1.1].  P: No other needs identified at this time.[NA1.2]      Revision History        User Key  "Date/Time User Provider Type Action    > NA1.2 12/25/2017  4:43 PM Curry Maradiaga LSW  Sign     NA1.1 12/25/2017 12:20 PM Curry Maradiaga LSW              ED Notes by Jae Prado RN at 12/25/2017  8:07 AM     Author:  Jae Prado RN Service:  (none) Author Type:  Registered Nurse    Filed:  12/25/2017  8:09 AM Date of Service:  12/25/2017  8:07 AM Creation Time:  12/25/2017  8:09 AM    Status:  Signed :  Jae Prado RN (Registered Nurse)         [facility]  ED Nurse Handoff Report    ED Chief complaint: Fall (Fall at airport injurying left wrist)      ED Diagnosis:   Final diagnoses:   Left wrist fracture       Code Status: Full Code    Allergies:   Allergies   Allergen Reactions     Doxycycline      Penicillins Rash       Activity level - Baseline/Home:  Independent    Activity Level - Current:   Stand with Assist     Needed?: No    Isolation: No  Infection: Not Applicable    Bariatric?: No    Vital Signs:   Vitals:    12/25/17 0550   BP: 121/82   Pulse: 111   Resp: 16   Temp: 97  F (36.1  C)   TempSrc: Oral   SpO2: 99%   Weight: 98.9 kg (218 lb)   Height: 1.651 m (5' 5\")       Cardiac Rhythm: ,        Pain level: 0-10 Pain Scale: 8    Is this patient confused?: No    Patient Report: Initial Complaint: Fall  Focused Assessment: Patient presents to ED after a fall at the airport with left wrist pain. Reports taking a fall after maneuvering herself with crutches after left ankle fracture from last night. Was on the way to return to [place].   Tests Performed: Xray  Abnormal Results: Xray  Treatments provided: fentanyl IV    Family Comments: boy friend at bedside    OBS brochure/video discussed/provided to patient: N/A    ED Medications:   Medications   fentaNYL (PF) (SUBLIMAZE) injection 50 mcg (50 mcg Intravenous Given 12/25/17 0624)   acetaminophen (TYLENOL) tablet 1,000 mg (1,000 mg Oral Given 12/25/17 0553)       Drips infusing?:  " No      ED NURSE PHONE NUMBER: [phone][XQ1.1]            Revision History        User Key Date/Time User Provider Type Action    > XQ1.1 12/25/2017  8:09 AM Jae Prado RN Registered Nurse Sign            ED Notes by Jeffery Ma RN at 12/25/2017  5:45 AM     Author:  Jeffery Ma RN Service:  (none) Author Type:  Registered Nurse    Filed:  12/25/2017  5:45 AM Date of Service:  12/25/2017  5:45 AM Creation Time:  12/25/2017  5:45 AM    Status:  Signed :  Jeffery Ma RN (Registered Nurse)         Bed: ED03  Expected date: 12/25/17  Expected time: 5:40 AM  Means of arrival: Ambulance  Comments:  [facility] 47F fall; poss. Wrist fx     Georgi Jones MD  12/25/17 0545       Revision History        User Key Date/Time User Provider Type Action    > JK1.1 12/25/2017  5:45 AM Jeffery Ma RN Registered Nurse Sign     RJ1.1 12/25/2017  5:45 AM Georgi Jones MD Physician                   Procedure Notes     No notes of this type exist for this encounter.         Progress Notes - Therapies (Notes from 12/23/17 through 12/26/17)      Progress Notes by Delilah Ramos PT at 12/26/2017  1:04 PM     Author:  Delilah Ramos, PT Service:  (none) Author Type:  Physical Therapist    Filed:  12/26/2017  1:04 PM Date of Service:  12/26/2017  1:04 PM Creation Time:  12/26/2017  1:04 PM    Status:  Signed :  Delilah Ramos PT (Physical Therapist)          12/26/17 1101   Quick Adds   Type of Visit Initial PT Evaluation   Living Environment   Lives With significant other   Living Arrangements house   Home Accessibility bed and bath are not on the first floor;stairs to enter home;stairs within home   Number of Stairs to Enter Home 2  (platform steps)   Number of Stairs Within Home 12  (with rail, to upstairs office and tub/shower; does not need)   Stair Railings at Home inside, present on right side   Transportation Available car;family or friend will provide   Living Environment  Comment Pt reports she plans to adjust her living environment to accommodate all needs on main level.   Self-Care   Dominant Hand right   Usual Activity Tolerance excellent   Current Activity Tolerance good   Regular Exercise yes   Activity/Exercise Type running/jogging   Exercise Amount/Frequency 3-5 times/wk   Equipment Currently Used at Home none   Functional Level Prior   Ambulation 0-->independent   Transferring 0-->independent   Toileting 0-->independent   Bathing 0-->independent   Dressing 0-->independent   Eating 0-->independent   Communication 0-->understands/communicates without difficulty   Swallowing 0-->swallows foods/liquids without difficulty   Cognition 0 - no cognition issues reported   Fall history within last six months yes   Number of times patient has fallen within last six months 2   Which of the above functional risks had a recent onset or change? ambulation;transferring;toileting;bathing;dressing;fall history   Prior Functional Level Comment Pt reports IND with ADLs/IADLs and functional mobility without AD, including working full time for which she travels by plane frequently, works from home as well.   General Information   Onset of Illness/Injury or Date of Surgery - Date 12/25/17   Referring Physician  Nivia Ndiaye MD    Patient/Family Goals Statement To go home to [place]   Pertinent History of Current Problem (include personal factors and/or comorbidities that impact the POC) Pt is a 48yo female admitted under observation status after sustaining two falls on separate days, first fall resulting in L mildly impacted distal radius fracture, L nondisplaced transverse fracture of ulnar styloid process; second fall resulting in L distal fibular fracture. Pt NWB on LUE and LLE. PMH significant for PPM ~3 years ago.   Precautions/Limitations fall precautions   Weight-Bearing Status - LUE nonweight-bearing   Weight-Bearing Status - LLE nonweight-bearing   General Observations Pt has splints/cast to L  "ankle and L wrist (fingers to distal humerus)   General Info Comments Activity: up with assist, NWB on LUE/LLE   Cognitive Status Examination   Orientation orientation to person, place and time   Level of Consciousness alert   Follows Commands and Answers Questions 100% of the time;able to follow multistep instructions   Personal Safety and Judgment intact   Memory intact   Pain Assessment   Patient Currently in Pain (\"minimal\")   Integumentary/Edema   Integumentary/Edema Comments L ankle and L wrist casted/splinted   Posture    Posture Not impaired   Range of Motion (ROM)   ROM Comment BLE WNL with AROM with exception of L ankle NT d/t fracture   Strength   Strength Comments BLE WFL for functional mobility, RLE 5/5   Bed Mobility   Bed Mobility Comments Independent supine<>sit   Transfer Skills   Transfer Comments CGA sit>stand to L platform walker   Gait   Gait Comments CGA 5' with L platform walker, NWB on LUE/LLE   Balance   Balance Comments Good at EOB, good with static stance   Sensory Examination   Sensory Perception Comments Denies N/T   General Therapy Interventions   Planned Therapy Interventions bed mobility training;gait training;strengthening;transfer training;home program guidelines;progressive activity/exercise   Clinical Impression   Criteria for Skilled Therapeutic Intervention yes, treatment indicated   PT Diagnosis Impaired gait   Influenced by the following impairments Pain, weakness, NWB LUE/LLE, decreased activity tolerance   Functional limitations due to impairments Decreased safety and independence with functional transfers, giat, stiars   Clinical Presentation Stable/Uncomplicated   Clinical Presentation Rationale clinically improving   Clinical Decision Making (Complexity) Low complexity   Predicted Duration of Therapy Intervention (days/wks) eval and single treatment   Anticipated Equipment Needs at Discharge wheelchair;platform walker;stool riser;shower chair   Anticipated Discharge " "Disposition Home with Assist   Risk & Benefits of therapy have been explained Yes   Patient, Family & other staff in agreement with plan of care Yes   [facility] TM \"6 Clicks\"   2016, Trustees of [facility], under license to [employer].  All rights reserved.   6 Clicks Short Forms Basic Mobility Inpatient Short Form   [facility] AM-PAC  \"6 Clicks\" V.2 Basic Mobility Inpatient Short Form   1. Turning from your back to your side while in a flat bed without using bedrails? 4 - None   2. Moving from lying on your back to sitting on the side of a flat bed without using bedrails? 4 - None   3. Moving to and from a bed to a chair (including a wheelchair)? 3 - A Little   4. Standing up from a chair using your arms (e.g., wheelchair, or bedside chair)? 3 - A Little   5. To walk in hospital room? 3 - A Little   6. Climbing 3-5 steps with a railing? 2 - A Lot   Basic Mobility Raw Score (Score out of 24.Lower scores equate to lower levels of function) 19   Total Evaluation Time   Total Evaluation Time (Minutes) 10[KJ1.1]        Revision History        User Key Date/Time User Provider Type Action    > KJ1.1 12/26/2017  1:04 PM Delilah Ramos, PT Physical Therapist Sign            Progress Notes by Dipak Seay OT at 12/26/2017  9:10 AM     Author:  Dipak Seay OT Service:  (none) Author Type:  Occupational Therapist    Filed:  12/26/2017  9:10 AM Date of Service:  12/26/2017  9:10 AM Creation Time:  12/26/2017  9:10 AM    Status:  Signed :  Dipak Seay OT (Occupational Therapist)          12/26/17 0858   Quick Adds   Type of Visit Initial Occupational Therapy Evaluation   Living Environment   Lives With significant other   Living Arrangements house   Home Accessibility house is not wheelchair accessible;stairs to enter home;stairs within home   Number of Stairs to Enter Home 2  (pt reports a landing between stairs)   Number of Stairs Within Home 12  (to office and upstairs bathroom " with tub - pt uses downstair)   Stair Railings at Home inside, present on right side  (partial railing)   Transportation Available car;family or friend will provide   Living Environment Comment Pt lives on main floor of house usually.  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   Self-Care   Dominant Hand right   Usual Activity Tolerance excellent   Current Activity Tolerance fair   Regular Exercise yes   Activity/Exercise Type running/jogging   Exercise Amount/Frequency 1 hr;3-5 times/wk   Equipment Currently Used at Home none   Activity/Exercise/Self-Care Comment pt was training for a half-marathon   Functional Level Prior   Ambulation 0-->independent   Transferring 0-->independent   Toileting 0-->independent   Bathing 0-->independent   Dressing 0-->independent   Eating 0-->independent   Communication 0-->understands/communicates without difficulty   Swallowing 0-->swallows foods/liquids without difficulty   Cognition 0 - no cognition issues reported   Fall history within last six months yes   Number of times patient has fallen within last six months 2   Which of the above functional risks had a recent onset or change? ambulation;transferring;bathing;dressing   Prior Functional Level Comment pt was independent, a little overwhelmed by her injuries   General Information   Onset of Illness/Injury or Date of Surgery - Date 12/25/17   Referring Physician Nivia Ndiaye   Patient/Family Goals Statement Pt planning on returning home to [place] for rehab as needed   Additional Occupational Profile Info/Pertinent History of Current Problem Pt initially fell slipping on ice, breaking her left ankle.  Ankle casted, pt given crutches to return home to [place].  Fell going into the airport and broke her left wrist.  PMH includes pacemaker implant 3 years ago, hypothryoridism   Precautions/Limitations fall precautions   Weight-Bearing Status - LUE nonweight-bearing  (left wrist)   Weight-Bearing Status - LLE  nonweight-bearing   Cognitive Status Examination   Orientation orientation to person, place and time   Level of Consciousness alert   Able to Follow Commands WNL/WFL   Personal Safety (Cognitive) WNL/WFL   Memory intact   Attention No deficits were identified   Visual Perception   Visual Perception No deficits were identified   Pain Assessment   Patient Currently in Pain Yes, see Vital Sign flowsheet   Range of Motion (ROM)   ROM Quick Adds Other (describe)   ROM Comment pt has full ROM on right side, left side limited by casts   Coordination   Upper Extremity Coordination Left UE impaired  (from cast)   Mobility   Bed Mobility Bed mobility skill: Supine to sit   Bed Mobility Skill: Supine to Sit   Level of [place]: Supine/Sit stand-by assist   Physical Assist/Nonphysical Assist: Supine/Sit verbal cues;1 person assist   Assistive Device: Supine/Sit bedrail   Upper Body Dressing   Level of [place]: Dress Upper Body stand-by assist  (using one handed techniques)   Physical Assist/Nonphysical Assist: Dress Upper Body verbal cues;1 person assist   Lower Body Dressing   Level of [place]: Dress Lower Body stand-by assist  (using one handed techniques)   Physical Assist/Nonphysical Assist: Dress Lower Body verbal cues;1 person assist   Activities of Daily Living Analysis   Impairments Contributing to Impaired Activities of Daily Living balance impaired;pain;post surgical precautions;ROM decreased;strength decreased   General Therapy Interventions   Planned Therapy Interventions IADL retraining;transfer training;home program guidelines   Clinical Impression   Criteria for Skilled Therapeutic Interventions Met yes, treatment indicated   OT Diagnosis Decreased ROM and independence in ADLS   Influenced by the following impairments weakness, post surgical precautions, decreased ROM   Assessment of Occupational Performance 5 or more Performance Deficits   Identified Performance Deficits Decreased independence in  "dressing, grooming, bathing, functional transfers. work and home tasks   Clinical Decision Making (Complexity) High complexity   Therapy Frequency daily   Predicted Duration of Therapy Intervention (days/wks) 3 days   Anticipated Discharge Disposition Home with Outpatient Therapy   Risks and Benefits of Treatment have been explained. Yes   Patient, Family & other staff in agreement with plan of care Yes   [facility] TM \"6 Clicks\"   2016, Trustees of [facility], under license to [employer].  All rights reserved.   6 Clicks Short Forms Daily Activity Inpatient Short Form   [facility]-PAC  \"6 Clicks\" Daily Activity Inpatient Short Form   1. Putting on and taking off regular lower body clothing? 3 - A Little   2. Bathing (including washing, rinsing, drying)? 2 - A Lot   3. Toileting, which includes using toilet, bedpan or urinal? 3 - A Little   4. Putting on and taking off regular upper body clothing? 3 - A Little   5. Taking care of personal grooming such as brushing teeth? 3 - A Little   6. Eating meals? 3 - A Little   Daily Activity Raw Score (Score out of 24.Lower scores equate to lower levels of function) 17   Total Evaluation Time   Total Evaluation Time (Minutes) 15[JF1.1]        Revision History        User Key Date/Time User Provider Type Action    > JF1.1 12/26/2017  9:10 AM Dipak Seay OT Occupational Therapist Sign            "

## 2017-12-25 NOTE — IP AVS SNAPSHOT
` ` Patient Information     Patient Name Sex     Berenice Bowser ([id number]) Female 1970       Room Bed    5522 5522-01      Patient Demographics     Address Phone    [address] [address] [phone] (Home)  [phone] (Mobile)      Patient Ethnicity & Race     Ethnic Group Patient Race    American White      Emergency Contact(s)     Name Relation Home Work Mobile    Jeffery Vidal Significant other [phone]      Documents on File        Status Date Received Description       Documents for the Patient    Consent for EHR Access Received 17     Insurance Card       External Medication Information Consent       Patient ID       [facility] Specified Other       Consent for Services/Privacy Notice - Hospital/Clinic Received 17     Privacy Notice - [place] Received 17     Care Everywhere Prospective Auth Received 17        Documents for the Encounter    CMS IM for Patient Signature       Observation Notice Received 17       Admission Information     Attending Provider Admitting Provider Admission Type Admission Date/Time    Nivia Ndiaye MD Iqbal, Saima, MD Emergency 17  0545    Discharge Date Hospital Service Auth/Cert Status Service Area     Internal Medicine [facility]    Unit Room/Bed Admission Status       [facility] ORTHO SPEC UNIT 5522/5522-01 Admission (Confirmed)       Admission     Complaint    Ankle fracture, Wrist fracture, closed, left, initial encounter      Hospital Account     Name Acct ID Class Status Primary Coverage    Berenice Bowser [id number] Observation [facility] IMAN            Guarantor Account (for Hospital Account #[id number])     Name Relation to Pt Service Area Active? Acct Type    Berenice Bowser Self FCS Yes Personal/Family    Address Phone          [address] [place] [address] [phone](H)              Coverage Information  (for Hospital Account #[id number])     F/O Payor/Plan Precert #    [employer] IMAN     Subscriber Subscriber #    Berenice Bowser [id number]    Address Phone    [address]  [place] [address] [phone]

## 2017-12-25 NOTE — DISCHARGE INSTRUCTIONS
Ankle Fracture, Distal Fibula  You have a fracture, or broken bone, of the end of the fibula bone. The fibula is one of two bones that support the ankle joint.    Home care    You will be given a splint, cast, or special boot to prevent movement at the injury site. Do not put weight on a splint. It will break. Follow your healthcare provider s advice about when to begin bearing weight on a cast or boot.    Keep your leg elevated when sitting or lying down. When sleeping, place a pillow under the injured leg. When sitting, support the injured leg so it is level with your waist. This is very important during the first 48 hours.    Keep the cast or splint completely dry at all times. When bathing, protect the cast or splint with 2 large plastic bags. Place 1 bag outside of the other. Tape each bag with duct tape at the top end. Water can still leak in even when the foot is covered. So it's best to keep the cast, splint, or boot away from water. If a fiberglass cast or splint gets wet, dry it with a hair dryer on a cool setting.    Place an ice pack over the injured area for no more than 15 to 20 minutes. Do this every 3 to 6 hours for the first 24 to 48 hours. Continue this 3 to 4 times a day as needed. To make an ice pack, put ice cubes in a plastic bag that seals at the top. Wrap the bag in a clean, thin towel or cloth. Never put ice or an ice pack directly on the skin. The ice pack can be put right on the cast or splint. As the ice melts, be careful that the cast or splint doesn t get wet.    You may use over-the-counter pain medicine to control pain, unless another pain medicine was prescribed. If you have chronic liver or kidney disease or ever had a stomach ulcer or GI bleeding, talk with your provider before using these medicines.  Follow-up care  Follow up with your healthcare provider in 1 week, or as advised. This is to be sure the bone is healing properly. If you were given a splint, it may be changed to a  cast after the swelling goes down.  If X-rays were taken, you will be told of any new findings that may affect your care.  When to seek medical advice  Call your healthcare provider right away if any of these occur:    The plaster cast or splint becomes wet or soft    The fiberglass cast or splint stays wet for more than 24 hours    There is increased tightness or pain under the cast or splint    Your toes become swollen, cold, blue, numb, or tingly    The cast becomes loose    The cast has a bad smell    Sore areas develop under the cast    The cast develops cracks or breaks   Date Last Reviewed: 11/23/2015 2000-2017 [employer]. [address] [place] [address]. All rights reserved. This information is not intended as a substitute for professional medical care. Always follow your healthcare professional's instructions.          Discharge Instructions: Caring for Your Splint  You will be going home with a splint. This is sometimes called a removable cast. A splint helps your body heal by holding your injured bones or joints in place. Take good care of your splint. A damaged splint can keep your injury from healing well. If your splint becomes damaged or loses its shape, you may need to replace it.   Home care    Wear your splint according to your doctor s instructions.    Keep the splint dry at all times. Bathe with your splint well out of the water. You can hold the splint outside the tub or shower when bathing. Protect it with a large plastic bag closed at the top end with a rubber band. Use two layers of plastic to help keep the splint dry. Or you can buy a waterproof shield.    If a splint gets wet, dry it with a hair dryer on the  cool  setting. Don t use the warm or hot setting, because those settings can burn your skin.    Always keep the splint clean and away from dirt.    Wash the Velcro straps and inner cloth sleeve (stockinet) with soapy water and air dry.    Keep your splint  away from open flames.    Don t expose your splint to heat, space heaters, or prolonged sunlight. Excessive heat will cause the splint to change shape.    Don t cut or tear the splint.     Exercise all the nearby joints not kept still by the splint. If you have a long leg splint, exercise your hip joint and your toes. If you have an arm splint, exercise your shoulder, elbow, thumb, and fingers.    Elevate the part of your body that is in the splint. This helps reduce swelling.  Follow-up care  Make a follow-up appointment with your healthcare provider, or as advised.  When to call your healthcare provider  Call your healthcare provider right away if you have any of these:    Tingling or numbness in the affected area    Severe pain that cannot be relieved with medicine    Cast that feels too tight or too loose    Swelling, coldness, or blue-gray color in the fingers or toes    Cast that is damaged, cracked, or has rough edges that hurt    Pressure sores or red marks that don t go away within 1 hour after removing the splint    Blisters   Date Last Reviewed: 7/1/2016 2000-2017 [employer]. [address] [place] [address]. All rights reserved. This information is not intended as a substitute for professional medical care. Always follow your healthcare professional's instructions.

## 2017-12-25 NOTE — ED NOTES
"[facility]  ED Nurse Handoff Report    ED Chief complaint: Fall (Fall at airport injurying left wrist)      ED Diagnosis:   Final diagnoses:   Left wrist fracture       Code Status: Full Code    Allergies:   Allergies   Allergen Reactions     Doxycycline      Penicillins Rash       Activity level - Baseline/Home:  Independent    Activity Level - Current:   Stand with Assist     Needed?: No    Isolation: No  Infection: Not Applicable    Bariatric?: No    Vital Signs:   Vitals:    12/25/17 0550   BP: 121/82   Pulse: 111   Resp: 16   Temp: 97  F (36.1  C)   TempSrc: Oral   SpO2: 99%   Weight: 98.9 kg (218 lb)   Height: 1.651 m (5' 5\")       Cardiac Rhythm: ,        Pain level: 0-10 Pain Scale: 8    Is this patient confused?: No    Patient Report: Initial Complaint: Fall  Focused Assessment: Patient presents to ED after a fall at the airport with left wrist pain. Reports taking a fall after maneuvering herself with crutches after left ankle fracture from last night. Was on the way to return to [place].   Tests Performed: Xray  Abnormal Results: Xray  Treatments provided: fentanyl IV    Family Comments: boy friend at bedside    OBS brochure/video discussed/provided to patient: N/A    ED Medications:   Medications   fentaNYL (PF) (SUBLIMAZE) injection 50 mcg (50 mcg Intravenous Given 12/25/17 0624)   acetaminophen (TYLENOL) tablet 1,000 mg (1,000 mg Oral Given 12/25/17 0553)       Drips infusing?:  No      ED NURSE PHONE NUMBER: [phone]         "

## 2017-12-25 NOTE — IP AVS SNAPSHOT
"          [place] [address] ORTHO SPECIALTY UNIT: [phone]                                              INTERAGENCY TRANSFER FORM - LAB / IMAGING / EKG / EMG RESULTS   2017                    Hospital Admission Date: 2017  PITA DENISE   : 1970  Sex: Female        Attending Provider: Nivia Ndiaye MD     Allergies:  Doxycycline, Penicillins    Infection:  None   Service:  INTERNAL MED    Ht:  1.651 m (5' 5\")   Wt:  98.9 kg (218 lb)   Admission Wt:  98.9 kg (218 lb)    BMI:  36.28 kg/m 2   BSA:  2.13 m 2            Patient PCP Information     Provider PCP Type    Provider Not In System General         Lab Results - 3 Days      Basic metabolic panel [[id number]]  Resulted: 17 0647, Result status: Final result    Ordering provider: Nivia Ndiaye MD  17 0000 Resulting lab: [facility]    Specimen Information    Type Source Collected On   Blood  17 0611          Components       Value Reference Range Flag Lab   Sodium 140 133 - 144 mmol/L  FrStHsLb   Potassium 3.7 3.4 - 5.3 mmol/L  FrStHsLb   Chloride 109 94 - 109 mmol/L  FrStHsLb   Carbon Dioxide 26 20 - 32 mmol/L  FrStHsLb   Anion Gap 5 3 - 14 mmol/L  FrStHsLb   Glucose 93 70 - 99 mg/dL  FrStHsLb   Urea Nitrogen 22 7 - 30 mg/dL  FrStHsLb   Creatinine 0.72 0.52 - 1.04 mg/dL  FrStHsLb   GFR Estimate 86 >60 mL/min/1.7m2  FrStHsLb   Comment:  Non  GFR Calc   GFR Estimate If Black >90 >60 mL/min/1.7m2  FrStHsLb   Comment:  African American GFR Calc   Calcium 8.6 8.5 - 10.1 mg/dL  FrStHsLb            CBC with platelets [[id number]]  Resulted: 17 0630, Result status: Final result    Ordering provider: Nivia Ndiaye MD  17 0000 Resulting lab: [facility]    Specimen Information    Type Source Collected On   Blood  17 0611          Components       Value Reference Range Flag Lab   WBC 5.0 4.0 - 11.0 10e9/L  FrStHsLb   RBC Count 4.11 3.8 - 5.2 10e12/L  FrStHsLb "   Hemoglobin 12.7 11.7 - 15.7 g/dL  FrStHsLb   Hematocrit 36.8 35.0 - 47.0 %  FrStHsLb   MCV 90 78 - 100 fl  FrStHsLb   MCH 30.9 26.5 - 33.0 pg  FrStHsLb   MCHC 34.5 31.5 - 36.5 g/dL  FrStHsLb   RDW 13.4 10.0 - 15.0 %  FrStHsLb   Platelet Count 239 150 - 450 10e9/L  FrStHsLb            Testing Performed By     Lab - Abbreviation Name Director Address Valid Date Range    14 - FrStHsLb [facility] Unknown [address] [place] [address] 05/08/15 1057 - Present            Unresulted Labs (24h ago through future)    Start       Ordered    12/28/17 0600  Creatinine  EVERY THREE DAYS,   Routine     Comments:  Last Lab Result: Creatinine (mg/dL)       Date                     Value                 12/26/2017               0.72             ----------    12/26/17 0831         Imaging Results - 3 Days      Wrist XR, G/E 3 views, left [[id number]]  Resulted: 12/25/17 0620, Result status: Final result    Ordering provider: Georgi Jones MD  12/25/17 0545 Resulted by: Miguel Angel Guzman MD    Performed: 12/25/17 0557 - 12/25/17 0614 Resulting lab: RADIOLOGY RESULTS    Narrative:       LEFT WRIST 3 VIEWS   12/25/2017 6:14 AM     HISTORY: Fall.    COMPARISON: None.      Impression:       IMPRESSION:   1. Acute mildly impacted fracture of the distal left radius. No  significant angulation about the fracture and no definite involvement  of the radiocarpal joint.  2. Nondisplaced acute transverse fracture of the left ulnar styloid  process.    MIGUEL ANGEL GUZMAN MD      Testing Performed By     Lab - Abbreviation Name Director Address Valid Date Range    104 - Rad Rslts RADIOLOGY RESULTS Unknown Unknown 02/16/05 1553 - Present            Encounter-Level Documents:     There are no encounter-level documents.      Order-Level Documents:     There are no order-level documents.

## 2017-12-25 NOTE — IP AVS SNAPSHOT
[address] [facility]    [address] [place] [address]    Phone:  [phone]                                       After Visit Summary   12/25/2017    Berenice Bowser    MRN: [id number]           After Visit Summary Signature Page     I have received my discharge instructions, and my questions have been answered. I have discussed any challenges I see with this plan with the nurse or doctor.    ..........................................................................................................................................  Patient/Patient Representative Signature      ..........................................................................................................................................  Patient Representative Print Name and Relationship to Patient    ..................................................               ................................................  Date                                            Time    ..........................................................................................................................................  Reviewed by Signature/Title    ...................................................              ..............................................  Date                                                            Time

## 2017-12-25 NOTE — CONSULTS
A/P:  48 yo woman with L ankle and L distal radius fracture.  Lives in [place].  Plan is to return home for definitive care.  NWB to L LE and L UE.  Ice and elevate.  Orthopedic follow-up in 7-10 days.  SW to help arrange for wheelchair for her to be transported.  May d/c per ortho needs with ortho follow up as outlined.  Consider lovenox or aspirin for DVT prophylaxis.

## 2017-12-25 NOTE — ED PROVIDER NOTES
"  History     Chief Complaint:  Fall, Wrist pain      HPI   Berenice Bowser is a 47 year old female who presents via EMS with wrist pain after a fall.  The patient reports [place] the airport [place] before [place] today when trying to maneuver on crutches after fracturing her left ankle last night.  She broke her fall with her left hand with immediate pain in her left wrist.  She is now has some numbness of her fingers as well.  She is currently visiting from [place] and is unsure how she will get back now with her 2 injuries.  She has family here with whom she can likely stay while recovering.  Her ankle is currently not bothering her as her wrist is more acutely painful.    Allergies:  Doxycycline   Penicillins - rash     Medications:    Norco    Past Medical History:    Ankle fracture   Hypothyroidism  Syncope      Past Surgical History:    Pacemaker placement    Family History:    History reviewed. No pertinent family history.     Social History:  Marital Status: single  Presents to the ED with her fiance  Tobacco Use: No previous or current tobacco use.  Alcohol Use: No alcohol use.   Living Situation: visiting from [place]  Occupation:       Review of Systems   Musculoskeletal: Positive for gait problem and joint swelling.   Neurological: Positive for numbness.   10 point review of systems performed and is negative except as above and in HPI.     Physical Exam   First Vitals:  BP: 121/82  Pulse: 111  Temp: 97  F (36.1  C)  Resp: 16  Height: 165.1 cm (5' 5\")  Weight: 98.9 kg (218 lb)  SpO2: 99 %    Physical Exam  General: Resting on the gurney, appears uncomfortable  Head:  The scalp, face, and head appear normal  Mouth/Throat: Mucus membranes are moist  CV:  Regular rate    Normal S1 and S2  No pathological murmur   Resp:  Breath sounds clear and equal bilaterally    Non-labored, no retractions or accessory muscle use    No coarseness    No wheezing   GI:  Abdomen is " soft, no rigidity    No tenderness to palpation  MS:  Normal motor assessment of all extremities.    Good capillary refill noted.    Left wrist tender to palpation.    Left lower extremity with splint in place. Toes pink, good capillary refill, normal sensation.  Skin:  No rash or lesions noted.  Neuro:  Speech is normal and fluent. No apparent deficit.  Sensation intact in the hand and fingers. The patient is able to give a thumb's up and ok sign and touch her thumb to pinky. She is able to flex and extend fingers and abduct and adduct fingers.  Brisk capillary refill in the hand.    Psych:  Awake. Alert.  Normal affect.      Appropriate interactions.     Emergency Department Course     Imaging:  Wrist x-ray, left (3 views):  1. Acute mildly impacted fracture of the distal left radius. No significant angulation about the fracture and no definite involvement of the radiocarpal joint.  2. Nondisplaced acute transverse fracture of the left ulnar styloid process.  Report per radiology.     Radiographic findings were communicated with the patient and Admitting MD who voiced understanding of the findings.    Procedures:   Splint Placement    PLACEMENT: Custom Orthoglass sugar tong splint was applied to the left upper extremity and after placement I checked and adjusted the fit to ensure proper positioning. The patient was more comfortable with the splint in place. Sensation and circulation are intact after splint placement.      Interventions:  (9870) Tylenol, 1000 mg, PO   (0624) Fentanyl, 50 mcg, IV injection     The patient's symptoms were improved with parenteral narcotics.    Emergency Department Course:  The patient arrived in the emergency department via [facility].   The patient was briefly evaluated on arrival by my colleague, , who placed initial orders.    The patient was sent for a wrist x-ray while in the emergency department, findings above.      Nursing notes and vitals reviewed.  I  performed an exam of the patient as documented above.     (5912) I spoke with Dr. Ndiaye of the hospitalist service regarding the patient.  Orthopedic consult requested.    (6519) I spoke with Dr. Mas of orthopedic surgery regarding the patient.      (6835) I again spoke with Dr. Mas after her review of the patient's x-rays, patient's injury is non-operative.  Dr. Ndiaye agrees to accept the patient for observation admission.      Impression & Plan      Medical Decision Making:  Berenice Bowser is a 47 year old female who presents for evaluation of left wrist pain after a fall.  CMS is intact distally in the extremity.  X-rays reveal a fracture that does not need reduction at this time.  Unfortunately, the patient also just sustained a left ankle fracture last night and is now unable to navigate with crutches.  Orthopedics was consulted from the ED and note the patient's injuries do not require surgical intervention.  Disposition is further complicated by the fact she is visiting from [place].  The patient has been requiring IV medication for pain control therefore will be observed in the hospital for further pain control and as well as planning for further orthopedic treatment and physical therapy with her 2 fractures.  The patients head to toe trauma exam is otherwise normal at this time and no further trauma workup is needed as I believe there is no signs of serious head, neck, chest, spinal, extremity or abdominal injuries.      Diagnosis:    ICD-10-CM    1. Left wrist fracture S62.102A      Disposition:  Observation     I, Ronda Esparza, am serving as a scribe on 12/25/2017 at 6:04 AM to personally document services performed by Dr. Garnett based on my observations and the provider's statements to me.    Ronda Esparza  12/25/2017    EMERGENCY DEPARTMENT       Georgi Jones MD  12/27/17 0320

## 2017-12-25 NOTE — CONSULTS
DATE OF SERVICE:  12/25/2017      CHIEF COMPLAINT:  Wrist pain and ankle pain.      HISTORY OF PRESENT ILLNESS:  Berenice Bowser is a 47-year-old woman who on 12/24 slipped and fractured her left ankle.  She was mobilizing at the airport and slipped and injured her wrist.  She was found to have a left wrist fracture.  Because of lack of mobility she was admitted and consultation requested.      Pain is well controlled with intermittent medications.  At this time she is alert and oriented, able to converse.  The biggest issue facing us is social in nature regarding patient transport back to home in [place].      No previous history of ankle fracture or wrist fracture.  Her wrist is more painful than her ankle.  The pain is achy at baseline, intermittently sharp, worse with attempted mobilization.      PAST MEDICAL HISTORY:  Remarkable for hypothyroidism, history of syncope.      MEDICATIONS:  Intermittent Norco for ankle fracture.      ALLERGIES:  Doxycycline, penicillin.      SOCIAL HISTORY:  Reveals she is about to be .  She does not use tobacco or nicotine and lives in [place].      PHYSICAL EXAMINATION:   GENERAL:  Appears healthy, alert and oriented x3.  Mood and affect appropriate.  Respirations nonlabored.     VITAL SIGNS:  Show her afebrile at 98.3, pulse of 70, respiratory rate of 16, /63.  She is satting 96% on room air.      Examination of her left upper extremity reveals a long arm splint which was well padded and positioned with the arm in flexion.  She does not have any finger swelling or bruising and brisk capillary refill less than 2 seconds.  Distal neurovascularly intact to examination with limitation of her cast which include MCP and IP joint extension.      Examination of her left lower extremity reveals a short leg nonweightbearing splint appears to be well fitting.  Brisk capillary refill to her toes show a nice pedicure with brisk capillary refill less than 2 seconds.  No  tenderness to squeeze over proximal calf which is soft and nontender, again neurovascularly intact to examination with limitations of her splints in place.      IMAGING:  X-rays of her left wrist dated today show a mildly displaced extraarticular fracture of the distal radius.      Examination dated 12/24/2017 of her left ankle show a distal fibula fracture with an increased medial clear space.      IMPRESSION:     1.  Displaced extra-articular left distal radius fracture, acute.   2.  Acute left distal fibular fracture with medial clear space widening.      PLAN:  At this point, I reviewed with her findings on her x-rays and discussed timing.  Surgical intervention may be required for the ankle and on an elective basis for her wrist given her mobilization issues.  I recommended that this be done on an outpatient basis for several reasons including definitive care and subsequent followup and hazards of travel postoperatively and a longer stay here in [place] versus returning home and having things dealt with in her home with appropriate followup.  Therefore, the plan is today for her to have social work with the assistance of arranging wheelchair for her which she can have both here in [place] and in [place] or at least have 1 arranged for her in [place] and one that is temporary to use here in [place].  She can discharge from an orthopedic perspective at any time.  Would recommend close outpatient followup in orthopedics in approximately 7-10 days.  I would also recommend that intermittent oral narcotics be used for pain and Lovenox or other blood thinner per medicine's choice for DVT prophylaxis, preferably from an orthopedic standpoint, Lovenox would be easier in case surgical management is warranted once she returns home, which is easily discontinued.  Greater than one hour was spent with the patient, greater than 50% counseling and coordination of her.         LETITIA DODSON MD             D:  2017 10:34   T: 2017 11:00   MT:       Name:     PITA DENISE   MRN:      [id number]        Account:       [id number]   :      1970           Consult Date:  2017      Document: [id number]

## 2017-12-25 NOTE — ED NOTES
Bed: ED03  Expected date: 12/25/17  Expected time: 5:40 AM  Means of arrival: Ambulance  Comments:  [address] 47F fall; poss. Wrist fx     Georgi Jones MD  12/25/17 0589

## 2017-12-25 NOTE — IP AVS SNAPSHOT
MRN:[id number]                      After Visit Summary   12/25/2017    Berenice Bowser    MRN: [id number]           Thank you!     Thank you for choosing [facility] for your care. Our goal is always to provide you with excellent care. Hearing back from our patients is one way we can continue to improve our services. Please take a few minutes to complete the written survey that you may receive in the mail after you visit with us. Thank you!        Patient Information     Date Of Birth          1970        Designated Caregiver       Most Recent Value    Caregiver    Will someone help with your care after discharge? yes    Name of designated caregiver Jeffery Vidal    Phone number of caregiver [phone]    Caregiver address [place]      About your hospital stay     You were admitted on:  December 25, 2017 You last received care in the:  [place] [address] [facility]    You were discharged on:  December 27, 2017        Reason for your hospital stay       You were admitted to the hospital sec to left wrist fracture                  Who to Call     For medical emergencies, please call 911.  For non-urgent questions about your medical care, please call your primary care provider or clinic, None          Attending Provider     Provider Specialty    Georgi Jones MD Emergency Medicine    [facility] Nga RUIZ MD Emergency Medicine    Ndaiye, MD [facility] Internal [facility]       Primary Care Provider Fax #    Provider Not In System [phone]      After Care Instructions     Activity       Your activity upon discharge: bedrest till further advised by your orthopedic and primary doctor .            Diet       Follow this diet upon discharge: Orders Placed This Encounter      Regular Diet Adult            Discharge Equipment: Wheelchair       The patient has a mobility limitation that significantly impairs his/her ability to participate in one or more mobility-related activities of  daily living (MRADLs).  The patient's mobility limitation cannot be sufficiently resolved by the use of an appropriately fitted cane or walker  The patient's home provides adequate access between rooms, maneuvering space and surfaces for the use of the manual wheelchair provided.  Use of a manual wheelchair will significantly improve the patient's ability to participate in MRADLs and the patient will use it on a regular basis in the home  The patient has not expressed an unwillingness to use the manual wheelchair that is provided in the home.  The patient has sufficient upper extremity function and other physical and mental capabilities needed to safely self-propel the manual wheelchair that is provided in the home during a typical day, OR the patient has a caregiver who is available, willing and able to provide assistance with the wheelchair when the patient has limitations.    Treatment Diagnosis: Left wrist and ankle Fracture.            Discharge Instructions       Please take full dose Asa AND get hold of your PCP and Orthopedic regarding being on blood thinner while you are at bed rest   Please get evaluated by your PCP or Ortho as soon as possible after reaching [place]                  Follow-up Appointments     Follow-up and recommended labs and tests        Follow up with primary care provider, Provider Not In System, within 7 days to evaluate medication change and for hospital follow- up.  The following labs/tests are recommended: BMP and CBC , please also call your PCP  To find out if you should be taking any anti coagulant to decrease risk for DVT while awaiting surgery , in the mean time please take  mg daily  After the meal.                  Additional Services     Dme Referral                 Further instructions from your care team       Please take full dose aspirin, 325 mg PO two times a day after meals till you are seen by your family doctor or orthopedic. Discuss with them if you  "should continue to take aspirin for DVT prophylaxis or you should be started on Lovenox.  Please continue to take your Toprol-XL and Synthroid as you were taking previously. You can use  you can use 4000 mg of Tylenol per day maximum for pain control.  Please get hold of your family doctor once you reach [place] for further instructions regarding your care.    Pending Results     Date and Time Order Name Status Description    12/25/2017 0913 EKG 12-lead, tracing only Preliminary             Statement of Approval     Ordered          12/26/17 1329  I have reviewed and agree with all the recommendations and orders detailed in this document.  EFFECTIVE NOW     Approved and electronically signed by:  Nivia Ndiaye MD           12/25/17 4026  I have reviewed and agree with all the recommendations and orders detailed in this document.  EFFECTIVE NOW     Approved and electronically signed by:  Nivia Ndiaye MD             Admission Information     Date & Time Provider Department Dept. Phone    12/25/2017 Nivia Ndiaye MD [place] [address] [facility] [phone]      Your Vitals Were     Blood Pressure Pulse Temperature Respirations Height Weight    125/76 (BP Location: Right arm) 70 97.8  F (36.6  C) (Oral) 16 1.651 m (5' 5\") 98.9 kg (218 lb)    Pulse Oximetry BMI (Body Mass Index)                94% 36.28 kg/m2          [employer] Information     [employer] lets you send messages to your doctor, view your test results, renew your prescriptions, schedule appointments and more. To sign up, go to [email or web address] . Click on \"Log in\" on the left side of the screen, which will take you to the Welcome page. Then click on \"Sign up Now\" on the right side of the page.     You will be asked to enter the access code listed below, as well as some personal information. Please follow the directions to create your username and password.     Your access code is: [email or web address]  Expires: 3/24/2018 10:57 PM     Your access " code will  in 90 days. If you need help or a new code, please call your [facility] clinic or [phone].        Care EveryWhere ID     This is your Care EveryWhere ID. This could be used by other organizations to access your [facility] medical records  [id number]        Equal Access to Services     ANURADHA ANGULO : Hadii aad ku hadslyo Sosukhali, waaxda luqadaha, qaybta kaalmada adeegrandynereida, sushila whipplegiovanniava sterling. So [facility] [phone].    ATENCIÓN: Si habla español, tiene a steel disposición servicios gratuitos de asistencia lingüística. Estradaame al [phone].    We comply with applicable federal civil rights laws and [place] laws. We do not discriminate on the basis of race, color, national origin, age, disability, sex, sexual orientation, or gender identity.               Review of your medicines      START taking        Dose / Directions    * order for DME        Equipment being ordered: Commode () Treatment Diagnosis: Left ankle and wrist fracture   Quantity:  1 each   Refills:  0       * order for DME        Equipment being ordered: Walker Wheels (), Walker () and Walker Platform () Treatment Diagnosis: Impaired gait   Quantity:  1 each   Refills:  0       * Notice:  This list has 2 medication(s) that are the same as other medications prescribed for you. Read the directions carefully, and ask your doctor or other care provider to review them with you.      CONTINUE these medicines which have NOT CHANGED        Dose / Directions    HYDROcodone-acetaminophen 5-325 MG per tablet   Commonly known as:  NORCO        Dose:  1-2 tablet   Take 1-2 tablets by mouth every 4 hours as needed for moderate to severe pain   Quantity:  15 tablet   Refills:  0       LEVOTHYROXINE SODIUM PO        Dose:  137 mcg   Take 137 mcg by mouth every morning   Refills:  0       metoprolol 50 MG 24 hr tablet   Commonly known as:  TOPROL-XL        Dose:  1 tablet   Take 1 tablet by mouth daily    Refills:  0            Where to get your medicines      Some of these will need a paper prescription and others can be bought over the counter. Ask your nurse if you have questions.     Bring a paper prescription for each of these medications     order for DME    order for DME                Protect others around you: Learn how to safely use, store and throw away your medicines at www.disposemymeds.org.             Medication List: This is a list of all your medications and when to take them. Check marks below indicate your daily home schedule. Keep this list as a reference.      Medications           Morning Afternoon Evening Bedtime As Needed    HYDROcodone-acetaminophen 5-325 MG per tablet   Commonly known as:  NORCO   Take 1-2 tablets by mouth every 4 hours as needed for moderate to severe pain   Next Dose Due:  Resume as ordered                                LEVOTHYROXINE SODIUM PO   Take 137 mcg by mouth every morning   Last time this was given:  137 mcg on 12/26/2017  6:41 AM   Next Dose Due:  12/27                                   metoprolol 50 MG 24 hr tablet   Commonly known as:  TOPROL-XL   Take 1 tablet by mouth daily   Last time this was given:  50 mg on 12/26/2017  8:56 AM   Next Dose Due:  12/27/17                                   * order for DME   Equipment being ordered: Commode () Treatment Diagnosis: Left ankle and wrist fracture                                * order for DME   Equipment being ordered: Walker Wheels (), Walker () and Walker Platform () Treatment Diagnosis: Impaired gait                                * Notice:  This list has 2 medication(s) that are the same as other medications prescribed for you. Read the directions carefully, and ask your doctor or other care provider to review them with you.

## 2017-12-25 NOTE — H&P
[facility]    History and Physical  Hospitalist       Date of Admission:  12/25/2017    Cumulative Summary:  Berenice Bowser is a 47 year old female with past medical history significant for hypothyroidism, status post pacemaker placement in 2014 due to recurrent syncopal episodes and was found to have 24 seconds asystole on tilt table, has been asymptomatic since then was admitted from the emergency room when she presented after a fall and ended up getting left wrist fracture. Patient is visiting from [place] and is planned to get  in six days, she also had a fall yesterday and was found to have left ankle fracture and was planning to return to [place] this morning when she had a fall at airport and ended up with left wrist fracture. Patient is admitted for further evaluation and management and for further orthopedic consultation.    Assessment & Plan     Principal Problem:    Fracture of wrist, left, closed, initial encounter (12/25/2017): status post fall this a.m. while she was on crutches at [facility]    Ankle fracture, left, closed, initial encounter (12/25/2017): status post fall yesterday.    Personal history of fall (12/25/2017)    Preoperative examination (12/25/2017)    -- Will admit patient to orthopedic floor  -- will keep patient NPO at this point so she is evaluated by orthopedics if they would like to take her for surgery today.  -- Will keep patient bed rest till further recommendations by orthopedic  -- start patient on gentle hydration as she is NPO   -- if no plans for surgery today, will start patient on general diet.  -- From preoperative point of view, his revised cardiac risk index is 0.4% for intra-and postoperative cardiac complications. She is at very low risk for complications.  -- Will continue patient home beta-blocker patient can take her dose today.  -- Will continue patient on her home dose of Synthroid.    History of hypothyroidism:  -- continue  patient on home dose of Synthroid 137  g PO daily.    H/O cardiac pacemaker (12/25/2017)  History of recurrent syncope: underwent detailed workup in the past, was found to have 24 seconds asystole on tilt table test and after that underwent pacemaker placement. Patient has been doing very well and has been asymptomatic since then  -- continue patient on her home dose of beta-blocker.    DVT Prophylaxis: Pneumatic Compression Devices  Code Status: Full Code    Disposition: Expected discharge in next couple of days once she is evaluated by orthopedic and if she would like to go for her ankle surgery here rather than [place].    Nivia Ndiaye MD,FACP    Primary Care Physician   Provider Not In System    Chief Complaint   Left wrist pain after the fall at airport, was diagnosed with Left ankle fracture yesterday.    History is obtained from the patient    Patient Active Problem List   Diagnosis     Fracture of wrist, left, closed, initial encounter     Ankle fracture, left, closed, initial encounter     Other specified hypothyroidism     Personal history of fall     H/O cardiac pacemaker     Preoperative examination       History of Present Illness   Berenice Bowser is a 47 year old female who presented to the emergency room after she had a fall at the airport and she landed on her left wrist. She immediately felt the pain and was brought to the emergency room where she was found to have acute mildly impacted fracture of the distal left radius. No significant angulation was seen about the fracture.  Unfortunately patient also had a fall yesterday, and ended up injuring her left ankle and presented to the emergency room. On further evaluation she was found to have oblique fracture of distant fibula at and above the level of ankle joint. As she was visiting from [place] and is planned to get  in six days her ankle was splinted and she was planning to fly back to [place] this morning when she had this  recurrent fall at the airport and ended up with left wrist fracture.  Her past medical history is only significant for hypothyroidism for which she has been taking 137  g of Synthroid daily, she took her last bill this morning. She also had history of pacemaker placement in 2014 when she was worked up for recurrent episodes of syncope and was found to have 24 seconds of asystole on tilt table. She has been doing very well after getting the pacemaker and has been taking Toprol-XL 50 mg at night time, she did not take her last leg pill as she was in the emergency room for evaluation of her ankle fracture.  From her preoperative point of view, she is denying any history of diabetes mellitus, coronary artery disease, congestive heart failure or renal disease she has tolerated anesthesia in the past well for her pacemaker placement. Her METS scores are more than 10 ,she ran 8 miles three days ago and is very healthy at the baseline. She is denying any chest pain or shortness of breath with exertion.  Patient is admitted for further evaluation and management at this time after two fractures patient is hoping to get his ankle surgery here in [place].    Past Medical History    I have reviewed this patient's medical history and updated it with pertinent information if needed.   Past Medical History:   Diagnosis Date     Hypothyroid      Status post cardiac pacemaker procedure        Past Surgical History   I have reviewed this patient's surgical history and updated it with pertinent information if needed.  Past Surgical History:   Procedure Laterality Date     IMPLANT PACEMAKER         Prior to Admission Medications   Prior to Admission Medications   Prescriptions Last Dose Informant Patient Reported? Taking?   HYDROcodone-acetaminophen (NORCO) 5-325 MG per tablet   No No   Sig: Take 1-2 tablets by mouth every 4 hours as needed for moderate to severe pain      Facility-Administered Medications: None     Allergies    Allergies   Allergen Reactions     Doxycycline      Penicillins Rash       Social History   I have reviewed this patient's social history and updated it with pertinent information if needed. Berenice Bowser  reports that she has never smoked. She has never used smokeless tobacco. She reports that she drinks about 0.6 oz of alcohol per week  She reports that she does not use illicit drugs.    Family History   I have reviewed this patient's family history and updated it with pertinent information if needed.   Family History   Problem Relation Age of Onset     Hypertension Mother        Review of Systems   CONSTITUTIONAL:  Negative for  fatigue and generalized weakness.  EYES:  negative for blurred vision and visual disturbance  HEENT:  negative for hoarseness and voice change  RESPIRATORY:  negative for  cough with sputum, dyspnea, wheezing and chest pain  CARDIOVASCULAR:  negative for  chest pain, palpitations, orthopnea, exertional chest pressure/discomfort. She does have history of syncope and then on further workup was found to have prolonged episodes of asystole, her symptoms have resolved after pacemaker placement.  GASTROINTESTINAL: Negative for abd pain, nausea , vomiting ,constipation and abdominal pain  GENITOURINARY: Negative for burning /urgency and frequency.  HEMATOLOGIC/LYMPHATIC:  negative  ALLERGIC/IMMUNOLOGIC:  negative for drug reactions  ENDOCRINE:  negative for diabetic symptoms including polyuria, polydipsia and weight loss  MUSCULOSKELETAL: positive for left ankle pain from fracture and left wrist pain.  NEUROLOGICAL:  negative  BEHAVIOR/PSYCH: negative    Physical Exam   Temp: 97  F (36.1  C) Temp src: Oral BP: 121/82 Pulse: 111   Resp: 16 SpO2: 99 % O2 Device: None (Room air)    Vital Signs with Ranges  Temp:  [97  F (36.1  C)-98.5  F (36.9  C)] 97  F (36.1  C)  Pulse:  [111] 111  Heart Rate:  [70] 70  Resp:  [16-18] 16  BP: (110-121)/(63-82) 121/82  SpO2:  [99 %-100 %] 99 %  218 lbs 0  oz    Constitutional: Awake, alert,oriented to time, place and person , cooperative, no apparent distress.Pleasent and cooperative , fiance present at the bedside   Eyes: Conjunctiva and pupils examined and normal.  HEENT: Moist mucous membranes, normal dentition.  Respiratory: Clear to auscultation bilaterally, no crackles or wheezing.  Cardiovascular: Regular rate and rhythm, normal S1 and S2, and no murmur noted.  GI: Soft, non-distended, non-tender, normal bowel sounds.  Lymph/Hematologic: No anterior cervical or supraclavicular adenopathy.  Skin: No rashes, no cyanosis, no edema.  Musculoskeletal: left ankle and left wrist is in splint  Neurologic: Cranial nerves 2-12 intact, normal strength and sensation.  Psychiatric: Alert, oriented to person, place and time, no obvious anxiety or depression.    Data   Data reviewed today:  I personally reviewed the tele image(s) showing nsr.  No lab results found in last 7 days.    Imaging:  Recent Results (from the past 24 hour(s))   Ankle XR, G/E 3 views, left    Narrative    LEFT ANKLE THREE OR MORE VIEWS   12/24/2017 9:47 PM     HISTORY: Lateral malleolus pain after inversion injury.     COMPARISON: None.      Impression    IMPRESSION: There is an oblique fracture of the distal fibula which is  at and above the level of the ankle mortise joint. The tibia appears  to be laterally subluxed with respect to the talus. No definite  fracture of the medial malleolus head. This is concerning for rupture  of the medial collateral bands. No posterior malleolar fracture is  appreciated although given the mechanism there may be an occult  posterior malleolar fracture.      DEIDRE OLSEN MD   Wrist XR, G/E 3 views, left    Narrative    LEFT WRIST 3 VIEWS   12/25/2017 6:14 AM     HISTORY: Fall.    COMPARISON: None.      Impression    IMPRESSION:   1. Acute mildly impacted fracture of the distal left radius. No  significant angulation about the fracture and no definite involvement  of  the radiocarpal joint.  2. Nondisplaced acute transverse fracture of the left ulnar styloid  process.    REBEKAH BEST MD

## 2017-12-25 NOTE — PHARMACY-ADMISSION MEDICATION HISTORY
Admission medication history interview status for the 12/25/2017  admission is complete. See EPIC admission navigator for prior to admission medications     Medication history source reliability:Good    Actions taken by pharmacist (provider contacted, etc): Verified PTA med list with patient. Per pt, she is currently only on 2 prescription medications -- metoprolol and levothyroxine.    Additional medication history information not noted on PTA med list :None    Medication reconciliation/reorder completed by provider prior to medication history? No    Time spent in this activity: 30 min    Prior to Admission medications    Medication Sig Last Dose Taking? Auth Provider   metoprolol (TOPROL-XL) 50 MG 24 hr tablet Take 1 tablet by mouth daily 12/24/2017 at pm Yes Unknown, Entered By History   LEVOTHYROXINE SODIUM PO Take 137 mcg by mouth every morning 12/24/2017 at am Yes Unknown, Entered By History   HYDROcodone-acetaminophen (NORCO) 5-325 MG per tablet Take 1-2 tablets by mouth every 4 hours as needed for moderate to severe pain  Yes Keesha Viveros, CNP

## 2017-12-26 ENCOUNTER — APPOINTMENT (OUTPATIENT)
Dept: OCCUPATIONAL THERAPY | Facility: CLINIC | Age: 47
End: 2017-12-26
Attending: INTERNAL MEDICINE
Payer: COMMERCIAL

## 2017-12-26 ENCOUNTER — APPOINTMENT (OUTPATIENT)
Dept: PHYSICAL THERAPY | Facility: CLINIC | Age: 47
End: 2017-12-26
Attending: INTERNAL MEDICINE
Payer: COMMERCIAL

## 2017-12-26 PROBLEM — S62.102A WRIST FRACTURE, CLOSED, LEFT, INITIAL ENCOUNTER: Status: ACTIVE | Noted: 2017-12-26

## 2017-12-26 LAB
ANION GAP SERPL CALCULATED.3IONS-SCNC: 5 MMOL/L (ref 3–14)
BUN SERPL-MCNC: 22 MG/DL (ref 7–30)
CALCIUM SERPL-MCNC: 8.6 MG/DL (ref 8.5–10.1)
CHLORIDE SERPL-SCNC: 109 MMOL/L (ref 94–109)
CO2 SERPL-SCNC: 26 MMOL/L (ref 20–32)
CREAT SERPL-MCNC: 0.72 MG/DL (ref 0.52–1.04)
ERYTHROCYTE [DISTWIDTH] IN BLOOD BY AUTOMATED COUNT: 13.4 % (ref 10–15)
GFR SERPL CREATININE-BSD FRML MDRD: 86 ML/MIN/1.7M2
GLUCOSE SERPL-MCNC: 93 MG/DL (ref 70–99)
HCT VFR BLD AUTO: 36.8 % (ref 35–47)
HGB BLD-MCNC: 12.7 G/DL (ref 11.7–15.7)
MCH RBC QN AUTO: 30.9 PG (ref 26.5–33)
MCHC RBC AUTO-ENTMCNC: 34.5 G/DL (ref 31.5–36.5)
MCV RBC AUTO: 90 FL (ref 78–100)
PLATELET # BLD AUTO: 239 10E9/L (ref 150–450)
POTASSIUM SERPL-SCNC: 3.7 MMOL/L (ref 3.4–5.3)
RBC # BLD AUTO: 4.11 10E12/L (ref 3.8–5.2)
SODIUM SERPL-SCNC: 140 MMOL/L (ref 133–144)
WBC # BLD AUTO: 5 10E9/L (ref 4–11)

## 2017-12-26 PROCEDURE — 25000132 ZZH RX MED GY IP 250 OP 250 PS 637: Performed by: INTERNAL MEDICINE

## 2017-12-26 PROCEDURE — 97167 OT EVAL HIGH COMPLEX 60 MIN: CPT | Mod: GO | Performed by: OCCUPATIONAL THERAPIST

## 2017-12-26 PROCEDURE — 97530 THERAPEUTIC ACTIVITIES: CPT | Mod: GP | Performed by: PHYSICAL THERAPIST

## 2017-12-26 PROCEDURE — 40000133 ZZH STATISTIC OT WARD VISIT: Performed by: OCCUPATIONAL THERAPIST

## 2017-12-26 PROCEDURE — 25000128 H RX IP 250 OP 636: Performed by: INTERNAL MEDICINE

## 2017-12-26 PROCEDURE — 99217 ZZC OBSERVATION CARE DISCHARGE: CPT | Performed by: INTERNAL MEDICINE

## 2017-12-26 PROCEDURE — 85027 COMPLETE CBC AUTOMATED: CPT | Performed by: INTERNAL MEDICINE

## 2017-12-26 PROCEDURE — 80048 BASIC METABOLIC PNL TOTAL CA: CPT | Performed by: INTERNAL MEDICINE

## 2017-12-26 PROCEDURE — 97161 PT EVAL LOW COMPLEX 20 MIN: CPT | Mod: GP | Performed by: PHYSICAL THERAPIST

## 2017-12-26 PROCEDURE — G0378 HOSPITAL OBSERVATION PER HR: HCPCS

## 2017-12-26 PROCEDURE — 36415 COLL VENOUS BLD VENIPUNCTURE: CPT | Performed by: INTERNAL MEDICINE

## 2017-12-26 PROCEDURE — 99207 ZZC CDG-CODE CATEGORY CHANGED: CPT | Performed by: INTERNAL MEDICINE

## 2017-12-26 PROCEDURE — 97116 GAIT TRAINING THERAPY: CPT | Mod: GP,59 | Performed by: PHYSICAL THERAPIST

## 2017-12-26 PROCEDURE — 97535 SELF CARE MNGMENT TRAINING: CPT | Mod: GO | Performed by: OCCUPATIONAL THERAPIST

## 2017-12-26 PROCEDURE — 40000539 ZZH STATISTIC OT LEVEL II NURSERY VISIT: Performed by: OCCUPATIONAL THERAPIST

## 2017-12-26 PROCEDURE — 40000193 ZZH STATISTIC PT WARD VISIT: Performed by: PHYSICAL THERAPIST

## 2017-12-26 RX ADMIN — ACETAMINOPHEN 650 MG: 325 TABLET, FILM COATED ORAL at 17:04

## 2017-12-26 RX ADMIN — ACETAMINOPHEN 650 MG: 325 TABLET, FILM COATED ORAL at 04:36

## 2017-12-26 RX ADMIN — ACETAMINOPHEN 650 MG: 325 TABLET, FILM COATED ORAL at 08:57

## 2017-12-26 RX ADMIN — METOPROLOL SUCCINATE 50 MG: 50 TABLET, EXTENDED RELEASE ORAL at 08:56

## 2017-12-26 RX ADMIN — ACETAMINOPHEN 650 MG: 325 TABLET, FILM COATED ORAL at 20:30

## 2017-12-26 RX ADMIN — Medication 1 MG: at 20:40

## 2017-12-26 RX ADMIN — LEVOTHYROXINE SODIUM 137 MCG: 137 TABLET ORAL at 06:41

## 2017-12-26 RX ADMIN — ACETAMINOPHEN 650 MG: 325 TABLET, FILM COATED ORAL at 13:01

## 2017-12-26 RX ADMIN — ENOXAPARIN SODIUM 40 MG: 40 INJECTION SUBCUTANEOUS at 10:13

## 2017-12-26 NOTE — PLAN OF CARE
Problem: Patient Care Overview  Goal: Plan of Care/Patient Progress Review  Occupational Therapy Discharge Summary    Reason for therapy discharge:    Discharged to home.    Progress towards therapy goal(s). See goals on Care Plan in [facility] electronic health record for goal details.  Goals met    Therapy recommendation(s):    Continued therapy is recommended.  Rationale/Recommendations:  Follow up in [place] as needed. Pt looking for AE for home, will be getting it there..

## 2017-12-26 NOTE — PROGRESS NOTES
12/26/17 0858   Quick Adds   Type of Visit Initial Occupational Therapy Evaluation   Living Environment   Lives With significant other   Living Arrangements house   Home Accessibility house is not wheelchair accessible;stairs to enter home;stairs within home   Number of Stairs to Enter Home 2  (pt reports a landing between stairs)   Number of Stairs Within Home 12  (to office and upstairs bathroom with tub - pt uses downstair)   Stair Railings at Home inside, present on right side  (partial railing)   Transportation Available car;family or friend will provide   Living Environment Comment Pt lives on main floor of house usually.  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   Self-Care   Dominant Hand right   Usual Activity Tolerance excellent   Current Activity Tolerance fair   Regular Exercise yes   Activity/Exercise Type running/jogging   Exercise Amount/Frequency 1 hr;3-5 times/wk   Equipment Currently Used at Home none   Activity/Exercise/Self-Care Comment pt was training for a half-marathon   Functional Level Prior   Ambulation 0-->independent   Transferring 0-->independent   Toileting 0-->independent   Bathing 0-->independent   Dressing 0-->independent   Eating 0-->independent   Communication 0-->understands/communicates without difficulty   Swallowing 0-->swallows foods/liquids without difficulty   Cognition 0 - no cognition issues reported   Fall history within last six months yes   Number of times patient has fallen within last six months 2   Which of the above functional risks had a recent onset or change? ambulation;transferring;bathing;dressing   Prior Functional Level Comment pt was independent, a little overwhelmed by her injuries   General Information   Onset of Illness/Injury or Date of Surgery - Date 12/25/17   Referring Physician Nivia Ndiaye   Patient/Family Goals Statement Pt planning on returning home to [place] for rehab as needed   Additional Occupational Profile  Info/Pertinent History of Current Problem Pt initially fell slipping on ice, breaking her left ankle.  Ankle casted, pt given crutches to return home to [place].  Fell going into the airport and broke her left wrist.  PMH includes pacemaker implant 3 years ago, hypothryoridism   Precautions/Limitations fall precautions   Weight-Bearing Status - LUE nonweight-bearing  (left wrist)   Weight-Bearing Status - LLE nonweight-bearing   Cognitive Status Examination   Orientation orientation to person, place and time   Level of Consciousness alert   Able to Follow Commands WNL/WFL   Personal Safety (Cognitive) WNL/WFL   Memory intact   Attention No deficits were identified   Visual Perception   Visual Perception No deficits were identified   Pain Assessment   Patient Currently in Pain Yes, see Vital Sign flowsheet   Range of Motion (ROM)   ROM Quick Adds Other (describe)   ROM Comment pt has full ROM on right side, left side limited by casts   Coordination   Upper Extremity Coordination Left UE impaired  (from cast)   Mobility   Bed Mobility Bed mobility skill: Supine to sit   Bed Mobility Skill: Supine to Sit   Level of Harman: Supine/Sit stand-by assist   Physical Assist/Nonphysical Assist: Supine/Sit verbal cues;1 person assist   Assistive Device: Supine/Sit bedrail   Upper Body Dressing   Level of Harman: Dress Upper Body stand-by assist  (using one handed techniques)   Physical Assist/Nonphysical Assist: Dress Upper Body verbal cues;1 person assist   Lower Body Dressing   Level of Harman: Dress Lower Body stand-by assist  (using one handed techniques)   Physical Assist/Nonphysical Assist: Dress Lower Body verbal cues;1 person assist   Activities of Daily Living Analysis   Impairments Contributing to Impaired Activities of Daily Living balance impaired;pain;post surgical precautions;ROM decreased;strength decreased   General Therapy Interventions   Planned Therapy Interventions IADL retraining;transfer  "training;home program guidelines   Clinical Impression   Criteria for Skilled Therapeutic Interventions Met yes, treatment indicated   OT Diagnosis Decreased ROM and independence in ADLS   Influenced by the following impairments weakness, post surgical precautions, decreased ROM   Assessment of Occupational Performance 5 or more Performance Deficits   Identified Performance Deficits Decreased independence in dressing, grooming, bathing, functional transfers. work and home tasks   Clinical Decision Making (Complexity) High complexity   Therapy Frequency daily   Predicted Duration of Therapy Intervention (days/wks) 3 days   Anticipated Discharge Disposition Home with Outpatient Therapy   Risks and Benefits of Treatment have been explained. Yes   Patient, Family & other staff in agreement with plan of care Yes   [facility] TM \"6 Clicks\"   2016, Trustees of [facility], under license to [employer].  All rights reserved.   6 Clicks Short Forms Daily Activity Inpatient Short Form   [facility]  \"6 Clicks\" Daily Activity Inpatient Short Form   1. Putting on and taking off regular lower body clothing? 3 - A Little   2. Bathing (including washing, rinsing, drying)? 2 - A Lot   3. Toileting, which includes using toilet, bedpan or urinal? 3 - A Little   4. Putting on and taking off regular upper body clothing? 3 - A Little   5. Taking care of personal grooming such as brushing teeth? 3 - A Little   6. Eating meals? 3 - A Little   Daily Activity Raw Score (Score out of 24.Lower scores equate to lower levels of function) 17   Total Evaluation Time   Total Evaluation Time (Minutes) 15     "

## 2017-12-26 NOTE — PROGRESS NOTES
Spoke with [facility] regarding the order-they received the fax and will call Berenice in her room to confirm with her. The delivery address.

## 2017-12-26 NOTE — PLAN OF CARE
Problem: Patient Care Overview  Goal: Plan of Care/Patient Progress Review  Outcome: Improving  Patient A&Ox4. VSS on room air. CMS intact. LUE and LLE splints clean, dry, and intact. Pain managed with PRN Tylenol. Up to the bedside commode, voiding adequately. Patient slept between cares. Will continue to monitor.

## 2017-12-26 NOTE — PROGRESS NOTES
Care Transition Initial Assessment - SW  Reason For Consult: discharge planning  Met with: PATIENT,FAMILY    Principal Problem:    Fracture of wrist, left, closed, initial encounter  Active Problems:    Ankle fracture, left, closed, initial encounter    Other specified hypothyroidism    Personal history of fall    H/O cardiac pacemaker    Preoperative examination    Ankle fracture         DATA  Lives With: significant other  Living Arrangements: house  Description of Support System: Supportive, Involved  Who is your support system?: Significant Other  Support Assessment: Adequate family and caregiver support, Adequate social supports.   Identified issues/concerns regarding health management: Need for increased support/equipment at time of discharge.       Transportation Available: car, family or friend will provide    ASSESSMENT  Cognitive Status:  Alert, awake, oriented   Concerns to be addressed: discharge planning     SW reviewed chart and met with patient, significant other and RN ARIANA.  Patient was admitted 12/25/17 with Ankle fracture.  Anticipated discharge date:  12/27.  SW introduced self and role.  Patient resides in [place] and states today her biggest concerns are when she flies home tomorrow, will her needs be met at home.  Significant other stated he is off work for the next 2-3 weeks, thus, would be able to assist patient, however patient expresses concern if his assistance will be enough.  Patient is awaiting PT today to see what their recommendation is.  Patient is requesting the following equipment:  Commode, w/c and shower chair.  RN ARIANA has placed call to [employer] who is requesting scripts be faxed to them w/a plan to deliver the equipment to patient's home, if approved.  Per patient, however, she states her primary physician has now asked her to go to her home hospital ED for evaluation upon arrival in [place].  Pt asks RN CC to not fax scripts at this point, should she be admitted to  her local hospital.  LOLITA suggests patient call [employer] insurance herself to check on the following:  Does she have SNF benefit; can equipment be delivered to her home even if she may be admitted to her local hospital.  Patient appears to agree with this plan and stated she will call her insurance once PT is complete and update LOLITA or RN CC with how she wishes to proceed.    PLAN  Financial costs for the patient includes:  Possible transportation costs, respite care .  Patient given options and choices for discharge:  Yes; Home vs Respite care/SNF in [place], if recommended  Patient/family is agreeable to the plan?  YES  Patient Goals and Preferences: Discharge to home .  Patient anticipates discharging to:  home .    Continue to assist with discharge planning, as needed.    JD Whipple

## 2017-12-26 NOTE — PLAN OF CARE
Problem: Patient Care Overview  Goal: Plan of Care/Patient Progress Review  Outcome: Therapy, progress toward functional goals as expected  OT: Pt is a 47 year old female who initially fell slipping on ice, breaking her left ankle.  Ankle casted, pt given crutches to return home to [place].  Fell going into the airport and broke her left wrist.  PMH includes pacemaker implant 3 years ago, hypothryoridism.  Pt was visiting relatives here, works in .  She works from home and does significant travel by plane.  House is two stories but pt uses main floor for most everything.  Has second floor office but can move downstairs as needed.  Significant other present during session.    Discharge Planner OT   Patient plan for discharge: Home to [place] with follow up and therapy as needed.  Current status: Initial evaluation complete, treatment started.  Significant other present.  Pt initially very worried about her situation and ability to do anything at home.  Became more confortable with evaluation and trying tasks.   Discussed AE for home, pt would benefit from a reacher, shower chair and toilet safety from to help her transfer off her high toilet.  Pt came to EOB with SBA and verbal cues.  Able to use one handed techniques with instruction to don socks and pants.  Provided information on one handed techniques for ADLS and IADLs once home.  Barriers to return to prior living situation: Unable to weightbear on left ankle and wrist, some stairs at home.  Recommendations for discharge: Home with support and followup as needed.  Rationale for recommendations: Pt moving well for dressing, has dominant right hand still available for use.  Pt is also very motivated and has good support from her significant other.  Was much more confident about her skills by the end of the session.       Entered by: Dipak Seay 12/26/2017 9:16 AM

## 2017-12-26 NOTE — PLAN OF CARE
Problem: Patient Care Overview  Goal: Plan of Care/Patient Progress Review  Pt a/o x 4. VSS. Pain well controlled with prn tylenol. Does not tolerate oxy. CMS intact. GUADALUPE L wrist, L ankle d/t cast. Extremities warm, LUE intermittent tingling at times. Trying to finalize a d/c plan for back home in [place]. Tolerating regular diet, up with A1.

## 2017-12-26 NOTE — DISCHARGE INSTRUCTIONS
Please take full dose aspirin, 325 mg PO two times a day after meals till you are seen by your family doctor or orthopedic. Discuss with them if you should continue to take aspirin for DVT prophylaxis or you should be started on Lovenox.  Please continue to take your Toprol-XL and Synthroid as you were taking previously. You can use  you can use 4000 mg of Tylenol per day maximum for pain control.  Please get hold of your family doctor once you reach [place] for further instructions regarding your care.

## 2017-12-26 NOTE — DISCHARGE SUMMARY
[facility]    Discharge Summary  Hospitalist    Date of Admission:  12/25/2017  Date of Discharge:  12/27/2017  Discharging Provider: Nivia Ndiaye MD,FACP    Discharge Diagnoses     Principal Problem:    Fracture of wrist, left, closed, initial encounter  Active Problems:    Ankle fracture, left, closed, initial encounter    Other specified hypothyroidism    Personal history of fall    H/O cardiac pacemaker    Preoperative examination    Ankle fracture    Wrist fracture, closed, left, initial encounter      History of Present Illness   Berenice Bowser is a 47 year old female with past medical history significant for hypothyroidism, status post pacemaker placement in 2014 due to recurrent syncopal episodes and was found to have 24 seconds asystole on tilt table, has been asymptomatic since then was admitted from the emergency room when she presented after a fall and ended up getting left wrist fracture. Patient is visiting from [place] and is planned to get  in six days, she also had a fall yesterday and was found to have left ankle fracture and was planning to return to [place] this morning when she had a fall at airport and ended up with left wrist fracture. Patient is admitted for further evaluation and management and for further orthopedic consultation    Hospital Course   Berenice Bowser is a 47 year old female with past medical history significant for hypothyroidism, status post pacemaker placement in 2014 due to recurrent syncopal episodes and was found to have 24 seconds asystole on tilt table, has been asymptomatic since then, was admitted from the emergency room when she presented after a fall and was found to have left wrist fracture. Patient is visiting from [place] and is planned to get  in six days, she previously had a fall a day before admission, was found to have left ankle fracture and was planning to return to [place] when she again fell at airport  and now got left wrist fracture. Patient was admitted for further evaluation and management and orthopedic consultation.   Patient was evaluated by orthopedic and at this time although surgical intervention may be required for the ankle and on an elective basis for her wrist given her mobilization issues, at this time it is planned to pursue those treatments in [place] once she returns care coordinator and  are working closely with patient to help making arrangements for her travel and to also order necessary medical equipments that she might need before she is evaluated by healthcare provider in [place].  Patient was also given a dose of Lovenox before she left due to immobility and long flight , she will continue to take full dose ASA till she is seen by her PCP or Ortho.  Patient was seen and examined before the day of discharge , she is feeling well, does not have any complaints , I did review the discharge medications and instructions with the patient and plan for her to follow up with the PCP after the hospitalization .patient was in agreement , she is discharged in stable condition back to his [facility]    Nivia Ndiaye MD, FACP    Significant Results and Procedures   As below    Pending Results     NONE    Unresulted Labs Ordered in the Past 30 Days of this Admission     No orders found for last 61 day(s).          Code Status   Full Code       Primary Care Physician   Provider Not In System    Physical Exam   Temp: 97.7  F (36.5  C) Temp src: Oral BP: 132/73 Pulse: 56   Resp: 16 SpO2: 95 % O2 Device: None (Room air)    Vitals:    12/25/17 0550   Weight: 98.9 kg (218 lb)     Vital Signs with Ranges  Temp:  [97.7  F (36.5  C)-98.4  F (36.9  C)] 97.7  F (36.5  C)  Pulse:  [56-70] 56  Resp:  [16] 16  BP: (118-134)/(70-77) 132/73  SpO2:  [94 %-95 %] 95 %  I/O last 3 completed shifts:  In: 460 [P.O.:460]  Out: -     Constitutional: Awake, alert, cooperative, no apparent distress  Respiratory: Clear  to auscultation bilaterally, no crackles or wheezing  Cardiovascular: Regular rate and rhythm, normal S1 and S2, and no murmur noted  GI: Normal bowel sounds, soft, non-distended, non-tender  Skin/Integumen: No rashes, no cyanosis, no edema      Discharge Disposition   Discharged to home  Condition at discharge: Stable    Consultations This Hospital Stay   PHYSICAL THERAPY ADULT IP CONSULT  OCCUPATIONAL THERAPY ADULT IP CONSULT  ORTHOPEDIC SURGERY IP CONSULT  SOCIAL WORK IP CONSULT    Time Spent on this Encounter   I, Nivia Ndiaye, personally saw the patient today and spent less than or equal to 30 minutes discharging this patient.    Discharge Orders     Dme Referral     Reason for your hospital stay   You were admitted to the hospital sec to left wrist fracture     Follow-up and recommended labs and tests    Follow up with primary care provider, Provider Not In System, within 7 days to evaluate medication change and for hospital follow- up.  The following labs/tests are recommended: BMP and CBC , please also call your PCP  To find out if you should be taking any anti coagulant to decrease risk for DVT while awaiting surgery , in the mean time please take  mg daily  After the meal.     Activity   Your activity upon discharge: bedrest till further advised by your orthopedic and primary doctor .     Discharge Instructions   Please take full dose Asa AND get hold of your PCP and Orthopedic regarding being on blood thinner while you are at bed rest   Please get evaluated by your PCP or Ortho as soon as possible after reaching [place]     Discharge Equipment: Wheelchair   The patient has a mobility limitation that significantly impairs his/her ability to participate in one or more mobility-related activities of daily living (MRADLs).  The patient's mobility limitation cannot be sufficiently resolved by the use of an appropriately fitted cane or walker  The patient's home provides adequate access between rooms,  maneuvering space and surfaces for the use of the manual wheelchair provided.  Use of a manual wheelchair will significantly improve the patient's ability to participate in MRADLs and the patient will use it on a regular basis in the home  The patient has not expressed an unwillingness to use the manual wheelchair that is provided in the home.  The patient has sufficient upper extremity function and other physical and mental capabilities needed to safely self-propel the manual wheelchair that is provided in the home during a typical day, OR the patient has a caregiver who is available, willing and able to provide assistance with the wheelchair when the patient has limitations.    Treatment Diagnosis: Left wrist and ankle Fracture.     Full Code     Diet   Follow this diet upon discharge: Orders Placed This Encounter     Regular Diet Adult       Discharge Medications   Current Discharge Medication List      START taking these medications    Details   !! order for DME Equipment being ordered: Walker Wheels (), Walker () and Walker Platform ()  Treatment Diagnosis: Impaired gait  Qty: 1 each, Refills: 0    Associated Diagnoses: Ankle fracture, left, closed, initial encounter; Fracture of wrist, left, closed, initial encounter      !! order for DME Equipment being ordered: Commode ()  Treatment Diagnosis: Left ankle and wrist fracture  Qty: 1 each, Refills: 0    Associated Diagnoses: Ankle fracture, left, closed, initial encounter; Fracture of wrist, left, closed, initial encounter       !! - Potential duplicate medications found. Please discuss with provider.      CONTINUE these medications which have NOT CHANGED    Details   metoprolol (TOPROL-XL) 50 MG 24 hr tablet Take 1 tablet by mouth daily      LEVOTHYROXINE SODIUM PO Take 137 mcg by mouth every morning      HYDROcodone-acetaminophen (NORCO) 5-325 MG per tablet Take 1-2 tablets by mouth every 4 hours as needed for moderate to severe pain  Qty:  15 tablet, Refills: 0           Allergies   Allergies   Allergen Reactions     Doxycycline      Penicillins Rash     Data   Most Recent 3 CBC's:  Recent Labs   Lab Test  12/26/17   0611   WBC  5.0   HGB  12.7   MCV  90   PLT  239      Most Recent 3 BMP's:  Recent Labs   Lab Test  12/26/17   0611   NA  140   POTASSIUM  3.7   CHLORIDE  109   CO2  26   BUN  22   CR  0.72   ANIONGAP  5   VIC  8.6   GLC  93     Most Recent 2 LFT's:No lab results found.  Most Recent INR's and Anticoagulation Dosing History:  Anticoagulation Dose History     There is no flowsheet data to display.        Most Recent 3 Troponin's:No lab results found.  Most Recent Cholesterol Panel:No lab results found.  Most Recent 6 Bacteria Isolates From Any Culture (See EPIC Reports for Culture Details):No lab results found.  Most Recent TSH, T4 and A1c Labs:No lab results found.  Results for orders placed or performed during the hospital encounter of 12/25/17   Wrist XR, G/E 3 views, left    Narrative    LEFT WRIST 3 VIEWS   12/25/2017 6:14 AM     HISTORY: Fall.    COMPARISON: None.      Impression    IMPRESSION:   1. Acute mildly impacted fracture of the distal left radius. No  significant angulation about the fracture and no definite involvement  of the radiocarpal joint.  2. Nondisplaced acute transverse fracture of the left ulnar styloid  process.    REBEKAH BEST MD

## 2017-12-26 NOTE — PROGRESS NOTES
DME scripts faxed to [facility],commode and chair shower. [employer] has provided a flat form walker.  I spoke with [facility] and they do not have a safety frame in stock and it will take 5 days to obtain.  I faxed delivery address to them -[address] [place] [address] [place]. Her # is [phone] The plan is for the patient to leave at 4:30 am for the airport. She has follow up in [place] with her ortho this week. She is taking tylenol for pain and does not feel she needs any home meds.  Will call [facility] back to confirm they got the fax.

## 2017-12-26 NOTE — PLAN OF CARE
Problem: Patient Care Overview  Goal: Plan of Care/Patient Progress Review  Outcome: No Change  A/OX4,oxycodone for pain.Voiding per BSC.Dreg on  LUE and  LUE intact.Melatonin given for sleep.Pt planning to stay till Wednesday.Family at bedside.Will continue to monitor.

## 2017-12-26 NOTE — PLAN OF CARE
Problem: Fracture Orthopaedic (Adult)  Goal: Signs and Symptoms of Listed Potential Problems Will be Absent, Minimized or Managed (Fracture Orthopaedic)  Signs and symptoms of listed potential problems will be absent, minimized or managed by discharge/transition of care (reference Fracture Orthopaedic (Adult) CPG).   Outcome: Improving  Pt is A&Ox4, IV SL and VSS on RA. Pt gets up SBA with walker and GB. Pain treated with scheduled tylenol. Pt is voiding adequate amounts in the bathroom. Ace wrap to LUE and LLE is intact, good CMS, NWB on LLE. She is on a regular diet and tolerating that well. Significant other at bedside. Plan is for discharge tomorrow morning back home to [place].

## 2017-12-26 NOTE — PROGRESS NOTES
I called [facility] to obtain information needed for her medical equipment. I spoke with Berenice at [facility] regarding needed equipment-commode ,wheel chair,chair shower. They need diag,ht/wt and demographics faxed.    When I spoke with the patient she wanted to hold off on the faxing of scripts to [facility] until she spoke with Yelena regarding coverage of equipment since her plan is not clear as to going directly home or to the hospital in [place].

## 2017-12-26 NOTE — PLAN OF CARE
Problem: Patient Care Overview  Goal: Plan of Care/Patient Progress Review  PT: PT orders received, evaluation and single treatment completed. Pt is a 48yo female admitted under observation status after sustaining two falls on separate days, first fall resulting in L mildly impacted distal radius fracture, L nondisplaced transverse fracture of ulnar styloid process; second fall resulting in L distal fibular fracture. Pt NWB on LUE and LLE. Pt traveling here from [place] with her fiancee. Pt reports she has 2 steps to enter at home in [place], she plans to change living environment to accommodate all needs on main level. At baseline pt is IND with ADLs/IADLs and functional mobility without AD, including working full time for which she travels by plane frequently.     Discharge Planner PT   Patient plan for discharge: Still somewhat unclear- plans to stay at [facility] until she can disch from hospital to airport for flight back to [place]; see SW/CC notes for further details  Current status: Pt rates pain minimal. Pt is independent with supine<>sit. Pt transfers sit<>stand to L platform walker with CGA progressing to SBA with repetitions. Pt able to ambulate 15'x2, 10'x2 with L platform walker and CGA progressing to SBA. Pt able to adhere to LUE/LLE NWB well during mobility, no cues needed. Educated pt on options for stairs into her home in [place], attempted 1 step but pt fearful; with further discussion pt/wue report they plan to bump pt in on w/c. Pt/fiancee verbalize understanding of stair technique if w/c doesn't fit at home. Provided pt with supine/seated exercises for both strengthening and circulation benefits, educated on restrictions to LLE with ex.   Barriers to return to prior living situation: Pending ability to obtain appropriate DME, no barriers anticipated with AD and assist/supervision of makenna with mobility.  Recommendations for discharge: Return home to [place] with fiancee assist, use of L platform walker for short  distance mobility, will need w/c for longer distances (pt may get walker from [facility] vs in community pending her conversation with insurance; SW aware)  Rationale for recommendations: Pt demonstrates safe and adequate mobility to return home with use of AD, assist from fiancee. Pt/fiancee in agreement. Discussed with SW.       Entered by: Delilah Ramos 12/26/2017 12:12 PM

## 2017-12-26 NOTE — PROGRESS NOTES
12/26/17 1101   Quick Adds   Type of Visit Initial PT Evaluation   Living Environment   Lives With significant other   Living Arrangements house   Home Accessibility bed and bath are not on the first floor;stairs to enter home;stairs within home   Number of Stairs to Enter Home 2  (platform steps)   Number of Stairs Within Home 12  (with rail, to upstairs office and tub/shower; does not need)   Stair Railings at Home inside, present on right side   Transportation Available car;family or friend will provide   Living Environment Comment Pt reports she plans to adjust her living environment to accommodate all needs on main level.   Self-Care   Dominant Hand right   Usual Activity Tolerance excellent   Current Activity Tolerance good   Regular Exercise yes   Activity/Exercise Type running/jogging   Exercise Amount/Frequency 3-5 times/wk   Equipment Currently Used at Home none   Functional Level Prior   Ambulation 0-->independent   Transferring 0-->independent   Toileting 0-->independent   Bathing 0-->independent   Dressing 0-->independent   Eating 0-->independent   Communication 0-->understands/communicates without difficulty   Swallowing 0-->swallows foods/liquids without difficulty   Cognition 0 - no cognition issues reported   Fall history within last six months yes   Number of times patient has fallen within last six months 2   Which of the above functional risks had a recent onset or change? ambulation;transferring;toileting;bathing;dressing;fall history   Prior Functional Level Comment Pt reports IND with ADLs/IADLs and functional mobility without AD, including working full time for which she travels by plane frequently, works from home as well.   General Information   Onset of Illness/Injury or Date of Surgery - Date 12/25/17   Referring Physician  Nivia Ndiaye MD    Patient/Family Goals Statement To go home to [place]   Pertinent History of Current Problem (include personal factors and/or comorbidities that impact  "the POC) Pt is a 48yo female admitted under observation status after sustaining two falls on separate days, first fall resulting in L mildly impacted distal radius fracture, L nondisplaced transverse fracture of ulnar styloid process; second fall resulting in L distal fibular fracture. Pt NWB on LUE and LLE. PMH significant for PPM ~3 years ago.   Precautions/Limitations fall precautions   Weight-Bearing Status - LUE nonweight-bearing   Weight-Bearing Status - LLE nonweight-bearing   General Observations Pt has splints/cast to L ankle and L wrist (fingers to distal humerus)   General Info Comments Activity: up with assist, NWB on LUE/LLE   Cognitive Status Examination   Orientation orientation to person, place and time   Level of Consciousness alert   Follows Commands and Answers Questions 100% of the time;able to follow multistep instructions   Personal Safety and Judgment intact   Memory intact   Pain Assessment   Patient Currently in Pain (\"minimal\")   Integumentary/Edema   Integumentary/Edema Comments L ankle and L wrist casted/splinted   Posture    Posture Not impaired   Range of Motion (ROM)   ROM Comment BLE WNL with AROM with exception of L ankle NT d/t fracture   Strength   Strength Comments BLE WFL for functional mobility, RLE 5/5   Bed Mobility   Bed Mobility Comments Independent supine<>sit   Transfer Skills   Transfer Comments CGA sit>stand to L platform walker   Gait   Gait Comments CGA 5' with L platform walker, NWB on LUE/LLE   Balance   Balance Comments Good at EOB, good with static stance   Sensory Examination   Sensory Perception Comments Denies N/T   General Therapy Interventions   Planned Therapy Interventions bed mobility training;gait training;strengthening;transfer training;home program guidelines;progressive activity/exercise   Clinical Impression   Criteria for Skilled Therapeutic Intervention yes, treatment indicated   PT Diagnosis Impaired gait   Influenced by the following impairments " "Pain, weakness, NWB LUE/LLE, decreased activity tolerance   Functional limitations due to impairments Decreased safety and independence with functional transfers, giat, stiars   Clinical Presentation Stable/Uncomplicated   Clinical Presentation Rationale clinically improving   Clinical Decision Making (Complexity) Low complexity   Predicted Duration of Therapy Intervention (days/wks) eval and single treatment   Anticipated Equipment Needs at Discharge wheelchair;platform walker;stool riser;shower chair   Anticipated Discharge Disposition Home with Assist   Risk & Benefits of therapy have been explained Yes   Patient, Family & other staff in agreement with plan of care Yes   [facility] TM \"6 Clicks\"   2016, Trustees of [facility], under license to [employer].  All rights reserved.   6 Clicks Short Forms Basic Mobility Inpatient Short Form   [facility]  \"6 Clicks\" V.2 Basic Mobility Inpatient Short Form   1. Turning from your back to your side while in a flat bed without using bedrails? 4 - None   2. Moving from lying on your back to sitting on the side of a flat bed without using bedrails? 4 - None   3. Moving to and from a bed to a chair (including a wheelchair)? 3 - A Little   4. Standing up from a chair using your arms (e.g., wheelchair, or bedside chair)? 3 - A Little   5. To walk in hospital room? 3 - A Little   6. Climbing 3-5 steps with a railing? 2 - A Lot   Basic Mobility Raw Score (Score out of 24.Lower scores equate to lower levels of function) 19   Total Evaluation Time   Total Evaluation Time (Minutes) 10     "

## 2017-12-26 NOTE — PROGRESS NOTES
[facility]    Hospitalist Progress Note :     Cumulative Summary: Berenice Bowser is a 47 year old female with past medical history significant for hypothyroidism, status post pacemaker placement in 2014 due to recurrent syncopal episodes and was found to have 24 seconds asystole on tilt table, has been asymptomatic since then, was admitted from the emergency room when she presented after a fall and was found to have left wrist fracture. Patient is visiting from [place] and is planned to get  in six days, she previously had a fall a day before admission, was found to have left ankle fracture and was planning to return to [place] when she again fell at airport and now got left wrist fracture. Patient was admitted for further evaluation and management and orthopedic consultation. Patient was evaluated by orthopedic and at this time although surgical intervention may be required for the ankle and on an elective basis for her wrist given her mobilization issues, at this time it is planned to pursue those treatments in [place] once she returns care coordinator and  are working closely with patient to help making arrangements for her travel and to also order necessary medical equipments that she might need before she is evaluated by healthcare provider in [place].      Assessment & Plan     Fracture of wrist, left, closed, initial encounter (12/25/2017): status post fall yesterday. while she was on crutches at [facility]    Ankle fracture, left, closed, initial encounter (12/25/2017): status post fall day before yesterday    Personal history of fall (12/25/2017)    Preoperative examination (12/25/2017)     -- continue to monitor patient closely   -- appreciate orthopedic help, plan is to proceed with surgical intervention when patient returns to [place].  --Patient is a started back on her home medications.   -- Patient is also getting evaluated by physical and  occupational therapy this morning for recommendations regarding the discharge.  -- At this time prescriptions are given for wheelchair, bedside commode and shower chair.     History of hypothyroidism:  -- continue patient on home dose of Synthroid 137  g PO daily.     H/O cardiac pacemaker (12/25/2017)  History of recurrent syncope: underwent detailed workup in the past, was found to have 24 seconds asystole on tilt table test and after that underwent pacemaker placement. Patient has been doing very well and has been asymptomatic since then  -- continue patient on her home dose of beta-blocker.    DVT Prophylaxis: Enoxaparin (Lovenox) SQ, discussed with patient regarding continuing with four dose aspirin once she reaches home till she is seen by her primary care physician or orthopedic physician.  Patient is receiving Lovenox 40 mg subcu here , patient has an early flight tomorrow will administer her dose earlier so she has a better DVT coverage due to being immobile and undergoing 10 hours of flight.    Code Status: Full Code    Disposition: Expected discharge tomorrow ,  and care coordinators are working very closely with patient regarding arrangements for travel and for proper medical equipment need, highly appreciate their help.    Nivia Ndiaye MD, FACP  Text Page (7am - 6pm)      Interval History   patient was seen and examined this morning, what is about to work with physical therapy, lynny also present in the room. She told me that her pain is well controlled with Tylenol and she does not want to fill the prescription of oxycodone and instead made her sick. She told me that she feels much more comfortable and more confident about being able to manage with two fractures and is really hoping to reach home tomorrow.  We discussed about giving her Lovenox short before she leaves the hospital so she has a better coverage for DVT prophylaxis during her flight. I discussed with her to continue taking  aspirin 325 mg BID till she is evaluated by her primary care physician or orthopedic doctor after she reaches [place].  Patient showed understanding and is appreciative of all the care she has received here.    -Data reviewed today: I reviewed all new labs and imaging results over the last 24 hours.    I personally reviewed no images or EKG's today.    Physical Exam   Temp: 98.1  F (36.7  C) Temp src: Oral BP: 118/70 Pulse: 56   Resp: 16 SpO2: 95 % O2 Device: None (Room air)    Vitals:    12/25/17 0550   Weight: 98.9 kg (218 lb)     Vital Signs with Ranges  Temp:  [98.1  F (36.7  C)-98.4  F (36.9  C)] 98.1  F (36.7  C)  Pulse:  [54-70] 56  Resp:  [16] 16  BP: (115-134)/(63-77) 118/70  SpO2:  [94 %-96 %] 95 %  I/O last 3 completed shifts:  In: 460 [P.O.:460]  Out: -     GENERAL: Alert , awake and oriented. NAD. Conversational, appropriate.   HEENT: Normocephalic. EOMI. No icterus or injection. Nares normal.   LUNGS: Clear to auscultation. No dyspnea at rest.   HEART: Regular rate. Extremities perfused.   ABDOMEN: Soft, nontender, and nondistended. Positive bowel sounds.   EXTREMITIES: left wrist and ankle is in splint  NEUROLOGIC: Moves extremities x4 on command. No acute focal neurologic abnormalities noted.     Medications        docusate sodium  100 mg Oral BID     levothyroxine  137 mcg Oral QAM AC     metoprolol  50 mg Oral Daily     enoxaparin  40 mg Subcutaneous Q24H       Data     Recent Labs  Lab 12/26/17  0611   WBC 5.0   HGB 12.7   MCV 90         POTASSIUM 3.7   CHLORIDE 109   CO2 26   BUN 22   CR 0.72   ANIONGAP 5   VIC 8.6   GLC 93       Imaging:   No results found for this or any previous visit (from the past 24 hour(s)).

## 2017-12-27 VITALS
BODY MASS INDEX: 36.32 KG/M2 | HEART RATE: 70 BPM | WEIGHT: 218 LBS | TEMPERATURE: 97.8 F | OXYGEN SATURATION: 94 % | SYSTOLIC BLOOD PRESSURE: 125 MMHG | RESPIRATION RATE: 16 BRPM | DIASTOLIC BLOOD PRESSURE: 76 MMHG | HEIGHT: 65 IN

## 2017-12-27 PROCEDURE — 25000132 ZZH RX MED GY IP 250 OP 250 PS 637: Performed by: INTERNAL MEDICINE

## 2017-12-27 PROCEDURE — 40000133 ZZH STATISTIC OT WARD VISIT: Performed by: OCCUPATIONAL THERAPIST

## 2017-12-27 PROCEDURE — 25000128 H RX IP 250 OP 636: Performed by: INTERNAL MEDICINE

## 2017-12-27 PROCEDURE — 96372 THER/PROPH/DIAG INJ SC/IM: CPT

## 2017-12-27 PROCEDURE — G0378 HOSPITAL OBSERVATION PER HR: HCPCS

## 2017-12-27 RX ADMIN — ENOXAPARIN SODIUM 40 MG: 40 INJECTION SUBCUTANEOUS at 03:22

## 2017-12-27 RX ADMIN — IBUPROFEN 200 MG: 200 TABLET, FILM COATED ORAL at 03:25

## 2017-12-27 RX ADMIN — ACETAMINOPHEN 650 MG: 325 TABLET, FILM COATED ORAL at 00:23

## 2017-12-27 NOTE — PLAN OF CARE
Problem: Patient Care Overview  Goal: Plan of Care/Patient Progress Review  Outcome: Improving  Pt is ready for D/C, however anxious.  D/C time set for 0400.  Pt up w/ 1 platform walker to bathroom.  D/C'd saline locked IV from R AC, intact.

## 2017-12-27 NOTE — PLAN OF CARE
Problem: Patient Care Overview  Goal: Plan of Care/Patient Progress Review  Outcome: Improving  Pt c/o pain 5/10. Taking tylenol and Ibuprofen  for pain. Vital signs stable CMS intact. Pt d/c at 0430 with her fiance. Discharge instructions was given. Questions and concerns were answered. Belonging with pt.

## 2017-12-29 LAB — INTERPRETATION ECG - MUSE: NORMAL
